# Patient Record
Sex: MALE | Race: WHITE | NOT HISPANIC OR LATINO | Employment: OTHER | URBAN - METROPOLITAN AREA
[De-identification: names, ages, dates, MRNs, and addresses within clinical notes are randomized per-mention and may not be internally consistent; named-entity substitution may affect disease eponyms.]

---

## 2017-08-02 ENCOUNTER — ALLSCRIPTS OFFICE VISIT (OUTPATIENT)
Dept: OTHER | Facility: OTHER | Age: 62
End: 2017-08-02

## 2017-08-02 LAB
BILIRUB UR QL STRIP: NEGATIVE
CLARITY UR: NORMAL
COLOR UR: YELLOW
GLUCOSE (HISTORICAL): NEGATIVE
HGB UR QL STRIP.AUTO: NEGATIVE
KETONES UR STRIP-MCNC: NEGATIVE MG/DL
LEUKOCYTE ESTERASE UR QL STRIP: NEGATIVE
NITRITE UR QL STRIP: NEGATIVE
PH UR STRIP.AUTO: 6.5 [PH]
PROT UR STRIP-MCNC: NEGATIVE MG/DL
SP GR UR STRIP.AUTO: 1.02
UROBILINOGEN UR QL STRIP.AUTO: 0.2

## 2018-01-02 DIAGNOSIS — N40.1 ENLARGED PROSTATE WITH LOWER URINARY TRACT SYMPTOMS (LUTS): ICD-10-CM

## 2018-01-02 DIAGNOSIS — Z87.898 PERSONAL HISTORY OF OTHER SPECIFIED CONDITIONS: ICD-10-CM

## 2018-01-14 VITALS
WEIGHT: 177.25 LBS | BODY MASS INDEX: 24.01 KG/M2 | HEIGHT: 72 IN | SYSTOLIC BLOOD PRESSURE: 134 MMHG | DIASTOLIC BLOOD PRESSURE: 72 MMHG

## 2018-02-08 RX ORDER — AZELASTINE HYDROCHLORIDE 137 UG/1
SPRAY, METERED NASAL
Refills: 0 | COMMUNITY
Start: 2018-01-20 | End: 2018-10-06

## 2018-02-09 ENCOUNTER — OFFICE VISIT (OUTPATIENT)
Dept: UROLOGY | Facility: MEDICAL CENTER | Age: 63
End: 2018-02-09
Payer: MEDICARE

## 2018-02-09 VITALS
WEIGHT: 180 LBS | DIASTOLIC BLOOD PRESSURE: 80 MMHG | SYSTOLIC BLOOD PRESSURE: 140 MMHG | BODY MASS INDEX: 24.38 KG/M2 | HEIGHT: 72 IN

## 2018-02-09 DIAGNOSIS — Z87.438 HISTORY OF BPH: Primary | ICD-10-CM

## 2018-02-09 DIAGNOSIS — R97.20 ELEVATED PSA: ICD-10-CM

## 2018-02-09 LAB
SL AMB  POCT GLUCOSE, UA: NORMAL
SL AMB LEUKOCYTE ESTERASE,UA: NORMAL
SL AMB POCT BILIRUBIN,UA: NORMAL
SL AMB POCT BLOOD,UA: NORMAL
SL AMB POCT CLARITY,UA: CLEAR
SL AMB POCT COLOR,UA: YELLOW
SL AMB POCT KETONES,UA: NORMAL
SL AMB POCT NITRITE,UA: NORMAL
SL AMB POCT PH,UA: 5.5
SL AMB POCT SPECIFIC GRAVITY,UA: 1
SL AMB POCT URINE PROTEIN: NORMAL
SL AMB POCT UROBILINOGEN: NORMAL

## 2018-02-09 PROCEDURE — 81003 URINALYSIS AUTO W/O SCOPE: CPT | Performed by: NURSE PRACTITIONER

## 2018-02-09 PROCEDURE — 99214 OFFICE O/P EST MOD 30 MIN: CPT | Performed by: NURSE PRACTITIONER

## 2018-02-09 RX ORDER — DEXTROMETHORPHAN HYDROBROMIDE AND PROMETHAZINE HYDROCHLORIDE 15; 6.25 MG/5ML; MG/5ML
SYRUP ORAL
Refills: 0 | COMMUNITY
Start: 2018-02-06 | End: 2018-10-06

## 2018-02-09 NOTE — PROGRESS NOTES
2/9/2018    Nick [facility]  1955  [id number]        Assessment  bph  Elevated PSA      Plan  psa free and total   FU 6mth      Discussion  Hieu Dunbar is a 57 y o  male being managed by Dr Biggs Grade many years  On no urologic  Meds  Denies any symptoms  PSA done and recent at 4 5, has been up and down for 4 yrs  Admits to having intercourse day before last PSA  We will repeat in 6 mth and is due for JOHNATHAN  On next visit as well  History of Present Illness  57 y o  male presents today for BPH follow up  Had PSA last month  Denies any   Obstructive or irritative symptoms  No changes in his medical history, no hospitilizations,  No new meds  Did tear his rotator cuff recently so doing physical  therapy  Review of Systems  Review of Systems - General ROS: negative  Respiratory ROS: no cough, shortness of breath, or wheezing  positive for - sputum changes and sinus issues due to allergies  Gastrointestinal ROS: no abdominal pain, change in bowel habits, or black or bloody stools  Genito-Urinary ROS: no dysuria, trouble voiding, or hematuria  Musculoskeletal ROS: positive for - torn rotator cuff          Past Medical History  Past Medical History:   Diagnosis Date    BPH with obstruction/lower urinary tract symptoms     Enlarged prostate     Feeling of incomplete bladder emptying     Frequency of urination     Hematuria, microscopic     Nocturia     Poor urinary stream        Past Social History  Past Surgical History:   Procedure Laterality Date    HERNIA REPAIR      KNEE SURGERY         Past Family History  Family History   Problem Relation Age of Onset    Cancer Mother        Past Social history  Social History     Social History    Marital status: /Civil Union     Spouse name: N/A    Number of children: N/A    Years of education: N/A     Occupational History    Not on file       Social History Main Topics    Smoking status: Never Smoker    Smokeless tobacco: Never Used   [employer] Alcohol use No    Drug use: No    Sexual activity: Not on file     Other Topics Concern    Not on file     Social History Narrative    No narrative on file       Current Medications  Current Outpatient Prescriptions   Medication Sig Dispense Refill    acetaminophen-codeine (TYLENOL #3) 300-30 mg per tablet take 1 tablet by mouth every 6 hours  0    amLODIPine (NORVASC) 10 mg tablet   0    ibuprofen (MOTRIN) 800 mg tablet take 1 tablet by mouth three times a day as directed  0    amoxicillin (AMOXIL) 500 mg capsule take 1 capsule by mouth every 8 hours  0    Azelastine HCl 137 MCG/SPRAY SOLN   0    chlorhexidine (PERIDEX) 0 12 % solution Rinse with 1/2 ounce by mouth twice a day for 1 week  0    HYDROcodone-acetaminophen (NORCO) 5-325 mg per tablet take 1 tablet by mouth every 6 hours  0    promethazine-dextromethorphan (PHENERGAN-DM) 6 25-15 mg/5 mL oral syrup take 7 5 milliliter by mouth every 8 hours if needed  0     No current facility-administered medications for this visit  Allergies  Allergies   Allergen Reactions    Antihistamines, Diphenhydramine-Type     Other      Annotation - [id number]: Spices       Past Medical History, Social History, Family History, medications and allergies were reviewed  Vitals  Vitals:    02/09/18 1343   BP: 140/80   Weight: 81 6 kg (180 lb)   Height: 6' (1 829 m)       Physical Exam  Skin: warm, dry, intact  Cardiac: S1S2, HRR, Peripheral edema: negative  Pulmonary: Non-labored breathing, stuffy nose due to sinus infection  Abdomen: Soft, non-tender, non-distended  No surgical scars  No masses, tenderness, hernias noted  Musculoskeletal: AROM with no joint deformity or tenderness  Neurology: alert, oriented x3, affect appropriate  Genitourinary: Negative CVA tenderness, negative suprapubic tenderness            Results  No results found for: PSA  No results found for: GLUCOSE, CALCIUM, NA, K, CO2, CL, BUN, CREATININE  No results found for: WBC, HGB, HCT, MCV, PLT

## 2018-06-12 ENCOUNTER — APPOINTMENT (OUTPATIENT)
Dept: LAB | Facility: CLINIC | Age: 63
End: 2018-06-12
Payer: MEDICARE

## 2018-06-12 DIAGNOSIS — Z87.898 PERSONAL HISTORY OF OTHER SPECIFIED CONDITIONS: ICD-10-CM

## 2018-06-12 DIAGNOSIS — N40.1 ENLARGED PROSTATE WITH LOWER URINARY TRACT SYMPTOMS (LUTS): ICD-10-CM

## 2018-06-12 PROCEDURE — 36415 COLL VENOUS BLD VENIPUNCTURE: CPT

## 2018-06-12 PROCEDURE — 84153 ASSAY OF PSA TOTAL: CPT

## 2018-06-12 PROCEDURE — 84154 ASSAY OF PSA FREE: CPT

## 2018-06-14 LAB
PSA FREE MFR SERPL: 17.5 %
PSA FREE SERPL-MCNC: 0.89 NG/ML
PSA SERPL-MCNC: 5.1 NG/ML (ref 0–4)

## 2018-08-30 ENCOUNTER — OFFICE VISIT (OUTPATIENT)
Dept: UROLOGY | Facility: CLINIC | Age: 63
End: 2018-08-30
Payer: MEDICARE

## 2018-08-30 VITALS
HEIGHT: 72 IN | BODY MASS INDEX: 24.81 KG/M2 | DIASTOLIC BLOOD PRESSURE: 80 MMHG | WEIGHT: 183.2 LBS | HEART RATE: 62 BPM | SYSTOLIC BLOOD PRESSURE: 118 MMHG

## 2018-08-30 DIAGNOSIS — R97.20 PSA ELEVATION: Primary | ICD-10-CM

## 2018-08-30 DIAGNOSIS — R97.20 ELEVATED PSA: ICD-10-CM

## 2018-08-30 LAB
SL AMB  POCT GLUCOSE, UA: NORMAL
SL AMB LEUKOCYTE ESTERASE,UA: NORMAL
SL AMB POCT BLOOD,UA: NORMAL
SL AMB POCT CLARITY,UA: CLEAR
SL AMB POCT COLOR,UA: YELLOW
SL AMB POCT KETONES,UA: NORMAL
SL AMB POCT NITRITE,UA: NORMAL
SL AMB POCT PH,UA: 6.5
SL AMB POCT SPECIFIC GRAVITY,UA: 1.01
SL AMB POCT URINE PROTEIN: NORMAL

## 2018-08-30 PROCEDURE — 99213 OFFICE O/P EST LOW 20 MIN: CPT | Performed by: PHYSICIAN ASSISTANT

## 2018-08-30 PROCEDURE — 81002 URINALYSIS NONAUTO W/O SCOPE: CPT | Performed by: PHYSICIAN ASSISTANT

## 2018-08-30 RX ORDER — CIPROFLOXACIN 500 MG/1
500 TABLET, FILM COATED ORAL 2 TIMES DAILY
Qty: 6 TABLET | Refills: 0 | Status: SHIPPED | OUTPATIENT
Start: 2018-08-30 | End: 2018-09-02

## 2018-08-30 NOTE — PROGRESS NOTES
1  PSA elevation  POCT urine dip    ciprofloxacin (CIPRO) 500 mg tablet   2  Elevated PSA  US guided prostate biopsy         Assessment and plan:       1  Elevated PSA  - m I reviewed with the patient that his PSA remains elevated  We did discuss the concern for possible prostate cancer  Patient will need to undergo prostate biopsy for further evaluation  We reviewed the risks of biopsy including bleeding and infection  Patient is to hold any anti-inflammatories or aspirin products 1 week prior to biopsy  He was instructed to initiate ciprofloxacin the day before biopsy and perform an enema the morning of biopsy  Patient verbalized understanding  All questions answered  -        Ranjan Garcia PA-C      Chief Complaint      Six-month follow-up elevated PSA    History of Present Illness     Mc Kilgore is a 57 y o  male patient with a history of BPH and elevated PSA presenting for follow-up  Patient's most recent PSA is 5 1 (06/12/2018) with a 17 5% free PSA  Patient states that his PSAs have been awful bleeding over the past few years  He denies ever undergoing a prostate biopsy  Patient believes the highest his PSA ever was was at his last appointment 6 months ago at 4 5  I had fortunately do not have any further records to be on then  Patient feels like he has a good urinary stream, feels empty after urination, has nocturia 4 times nightly  Denies any dysuria, gross hematuria, suprapubic pressure, flank pain, bone pain, weight loss  Laboratory      Ref Range & Units 6/12/18  8:49 AM Flag   Prostate Specific Antigen Total 0 0 - 4 0 ng/mL 5 1   H    Comment: Roche ECLIA methodology  According to the American Urological Association, Serum PSA should   decrease and remain at undetectable levels after radical   prostatectomy   The AUA defines biochemical recurrence as an initial   PSA value 0 2 ng/mL or greater followed by a subsequent confirmatory   PSA value 0 2 ng/mL or greater  Values obtained with different assay methods or kits cannot be used   interchangeably  Results cannot be interpreted as absolute evidence   of the presence or absence of malignant disease  PSA, Free N/A ng/mL 0 89     Comment: Roche ECLIA methodology  PSA, Free Pct % 17 5     Comment: The table below lists the probability of prostate cancer for   men with non-suspicious JOHNATHAN results and total PSA between   4 and 10 ng/mL, by patient age Shannon Osborn, [address] [place] [address],   [id number])                    % Free PSA       50-64 yr        65-75 yr                     0 00-10 00%        56%             55%                    10 01-15 00%        24%             35%                    15 01-20 00%        17%             23%                    20 01-25 00%        10%             20%                         >25 00%         5%              9%   Please note:  Nina et al did not make specific                 recommendations regarding the use of                 percent free PSA for any other population                 of men  Narrative     Performed at: [facility] [address]  [id number]  : Wyatt Bledsoe MD, Phone:  [phone]      Specimen Collected: 06/12/18  8:49 AM Last Resulted: 06/14/18  6:06             Review of Systems     Review of Systems   Constitutional: Negative for activity change, appetite change, chills, diaphoresis, fatigue, fever and unexpected weight change  Respiratory: Negative for chest tightness and shortness of breath  Cardiovascular: Negative for chest pain, palpitations and leg swelling  Gastrointestinal: Negative for abdominal distention, abdominal pain, constipation, diarrhea, nausea and vomiting  Genitourinary: Negative for decreased urine volume, difficulty urinating, dysuria, enuresis, flank pain, frequency, genital sores, hematuria and urgency  Musculoskeletal: Negative for back pain, gait problem and myalgias     Skin: Negative for color change, pallor, rash and wound  Psychiatric/Behavioral: Negative for behavioral problems  The patient is not nervous/anxious  Allergies     Allergies   Allergen Reactions    Antihistamines, Diphenhydramine-Type     Other      Annotation - [id number]: Spices       Physical Exam     Physical Exam   Constitutional: He is oriented to person, place, and time  He appears well-developed and well-nourished  No distress  HENT:   Head: Normocephalic and atraumatic  Eyes: Conjunctivae are normal    Neck: Normal range of motion  No tracheal deviation present  Pulmonary/Chest: Effort normal    Genitourinary:   Genitourinary Comments: Prostate: 50g, no nodules   Musculoskeletal: Normal range of motion  He exhibits no edema or deformity  Neurological: He is alert and oriented to person, place, and time  Skin: Skin is warm and dry  No rash noted  He is not diaphoretic  No erythema  Psychiatric: He has a normal mood and affect   His behavior is normal          Vital Signs     Vitals:    08/30/18 1040   BP: 118/80   Pulse: 62   Weight: 83 1 kg (183 lb 3 2 oz)   Height: 6' (1 829 m)         Current Medications       Current Outpatient Prescriptions:     amLODIPine (NORVASC) 10 mg tablet, , Disp: , Rfl: 0    acetaminophen-codeine (TYLENOL #3) 300-30 mg per tablet, take 1 tablet by mouth every 6 hours, Disp: , Rfl: 0    amoxicillin (AMOXIL) 500 mg capsule, take 1 capsule by mouth every 8 hours, Disp: , Rfl: 0    Azelastine HCl 137 MCG/SPRAY SOLN, , Disp: , Rfl: 0    chlorhexidine (PERIDEX) 0 12 % solution, Rinse with 1/2 ounce by mouth twice a day for 1 week, Disp: , Rfl: 0    ciprofloxacin (CIPRO) 500 mg tablet, Take 1 tablet (500 mg total) by mouth 2 (two) times a day for 3 days Please start day before prostate biopsy, Disp: 6 tablet, Rfl: 0    HYDROcodone-acetaminophen (NORCO) 5-325 mg per tablet, take 1 tablet by mouth every 6 hours, Disp: , Rfl: 0    ibuprofen (MOTRIN) 800 mg tablet, take 1 tablet by mouth three times a day as directed, Disp: , Rfl: 0    promethazine-dextromethorphan (PHENERGAN-DM) 6 25-15 mg/5 mL oral syrup, take 7 5 milliliter by mouth every 8 hours if needed, Disp: , Rfl: 0      Active Problems     There is no problem list on file for this patient          Past Medical History     Past Medical History:   Diagnosis Date    BPH with obstruction/lower urinary tract symptoms     Enlarged prostate     Feeling of incomplete bladder emptying     Frequency of urination     Hematuria, microscopic     Nocturia     Poor urinary stream          Surgical History     Past Surgical History:   Procedure Laterality Date    HERNIA REPAIR      KNEE SURGERY           Family History     Family History   Problem Relation Age of Onset    Cancer Mother          Social History     Social History       Radiology

## 2018-10-06 ENCOUNTER — HOSPITAL ENCOUNTER (EMERGENCY)
Facility: HOSPITAL | Age: 63
Discharge: HOME | End: 2018-10-06
Attending: EMERGENCY MEDICINE | Admitting: EMERGENCY MEDICINE
Payer: MEDICARE

## 2018-10-06 ENCOUNTER — APPOINTMENT (EMERGENCY)
Dept: RADIOLOGY | Facility: HOSPITAL | Age: 63
End: 2018-10-06
Payer: MEDICARE

## 2018-10-06 VITALS
BODY MASS INDEX: 24.38 KG/M2 | RESPIRATION RATE: 16 BRPM | TEMPERATURE: 97.2 F | HEIGHT: 72 IN | HEART RATE: 72 BPM | SYSTOLIC BLOOD PRESSURE: 157 MMHG | WEIGHT: 180 LBS | DIASTOLIC BLOOD PRESSURE: 78 MMHG | OXYGEN SATURATION: 98 %

## 2018-10-06 DIAGNOSIS — J40 BRONCHITIS: Primary | ICD-10-CM

## 2018-10-06 PROCEDURE — 99283 EMERGENCY DEPT VISIT LOW MDM: CPT

## 2018-10-06 PROCEDURE — 71046 X-RAY EXAM CHEST 2 VIEWS: CPT

## 2018-10-06 RX ORDER — AZELASTINE 1 MG/ML
1 SPRAY, METERED NASAL 2 TIMES DAILY
Qty: 30 ML | Refills: 0 | Status: SHIPPED | OUTPATIENT
Start: 2018-10-06

## 2018-10-06 RX ORDER — PROMETHAZINE HYDROCHLORIDE AND CODEINE PHOSPHATE 6.25; 1 MG/5ML; MG/5ML
5 SYRUP ORAL EVERY 4 HOURS PRN
Qty: 120 ML | Refills: 0 | Status: SHIPPED | OUTPATIENT
Start: 2018-10-06 | End: 2018-10-16

## 2018-10-06 RX ORDER — BENZONATATE 100 MG/1
100 CAPSULE ORAL 3 TIMES DAILY PRN
COMMUNITY

## 2018-10-06 RX ORDER — PREDNISONE 1 MG/1
TABLET ORAL DAILY
COMMUNITY

## 2018-10-06 RX ORDER — AZITHROMYCIN 1 G
1 PACKET (EA) ORAL AS NEEDED
COMMUNITY

## 2018-10-06 NOTE — ED PROVIDER NOTES
History  Chief Complaint   Patient presents with    Sinus Problem    Cough    Allergies     This is a 39-year-old male comes to the emergency room with complaint of cough with productive sputum yellowish to white x8 days  He states that he has been treated by his family doctor and on his own  He took approximately 3 days of amoxicillin 1 tablet b i d  followed by a Tessalon Perles, and promethazine syrup none of which seems to be helping his symptoms  Patient denies fever  He does state that he has episodes of hacking cough and   He also has a prescription for a Z-Yonatan which he is to get filled today from his family doctor  The patient has also been on prednisone 3 times daily  He does not know the dosage  He requests a nasal spray and promethazine with codeine as he states this has helped his symptoms in the past   He speaks chronic allergic symptoms this time of year  Prior to Admission Medications   Prescriptions Last Dose Informant Patient Reported? Taking? amLODIPine (NORVASC) 10 mg tablet   Yes No   azithromycin (ZITHROMAX) 1 g powder   Yes Yes   Sig: Take 1 packet by mouth once   benzonatate (TESSALON PERLES) 100 mg capsule  Self Yes Yes   Sig: Take 100 mg by mouth 3 (three) times a day as needed for cough   predniSONE 5 mg tablet  Self Yes Yes   Sig: Take by mouth daily      Facility-Administered Medications: None       Past Medical History:   Diagnosis Date    BPH with obstruction/lower urinary tract symptoms     Enlarged prostate     Feeling of incomplete bladder emptying     Frequency of urination     Hematuria, microscopic     Nocturia     Poor urinary stream        Past Surgical History:   Procedure Laterality Date    HERNIA REPAIR      KNEE SURGERY         Family History   Problem Relation Age of Onset    Cancer Mother      I have reviewed and agree with the history as documented      Social History   Substance Use Topics    Smoking status: Never Smoker    Smokeless tobacco: Never Used    Alcohol use No        Review of Systems   Constitutional: Negative  Negative for fever  HENT: Positive for congestion, postnasal drip and rhinorrhea  Negative for ear discharge and ear pain  Eyes: Negative  Negative for discharge, redness and itching  Respiratory: Positive for cough  Negative for choking, chest tightness and shortness of breath  Cardiovascular: Negative  Negative for chest pain  Gastrointestinal: Negative  Endocrine: Negative  Genitourinary: Negative  Musculoskeletal: Negative  Skin: Negative  Neurological: Negative  Psychiatric/Behavioral: Negative  Physical Exam  Physical Exam   Constitutional: He is oriented to person, place, and time  He appears well-developed and well-nourished  HENT:   Head: Normocephalic and atraumatic  Right Ear: External ear normal    Left Ear: External ear normal    Nose: Nose normal    Mouth/Throat: Oropharynx is clear and moist    Eyes: Pupils are equal, round, and reactive to light  Conjunctivae and EOM are normal    Neck: Normal range of motion  Cardiovascular: Normal rate, regular rhythm, normal heart sounds and intact distal pulses  Exam reveals no friction rub  No murmur heard  Pulmonary/Chest: Effort normal and breath sounds normal  He has no wheezes  He has no rales  He exhibits no tenderness  Musculoskeletal: Normal range of motion  Neurological: He is alert and oriented to person, place, and time  Skin: Skin is warm and dry  No rash noted  Psychiatric: He has a normal mood and affect  Nursing note and vitals reviewed        Vital Signs  ED Triage Vitals [10/06/18 0753]   Temperature Pulse Respirations Blood Pressure SpO2   (!) 97 2 °F (36 2 °C) 73 18 168/89 99 %      Temp Source Heart Rate Source Patient Position - Orthostatic VS BP Location FiO2 (%)   Tympanic Monitor Sitting Left arm --      Pain Score       3           Vitals:    10/06/18 0753   BP: 168/89   Pulse: 73 Patient Position - Orthostatic VS: Sitting       Visual Acuity      ED Medications  Medications - No data to display    Diagnostic Studies  Results Reviewed     None                 XR chest 2 views   ED Interpretation by Bree Daley MD (10/06 2861)   No pneumonia                 Procedures  Procedures       Phone Contacts  ED Phone Contact    ED Course                               MDM  CritCare Time    Disposition  Final diagnoses:   Bronchitis     Time reflects when diagnosis was documented in both MDM as applicable and the Disposition within this note     Time User Action Codes Description Comment    10/6/2018  8:19 AM Imani Garcia Add [J40] Bronchitis       ED Disposition     ED Disposition Condition Comment    Discharge  Thang Harms discharge to home/self care  Condition at discharge: Good        Follow-up Information     Follow up With Specialties Details Why Contact Info    Sarah Morocho, DO General Practice  For follow up of your current symptoms  [address]  [place] [address]  [phone]            Patient's Medications   Discharge Prescriptions    AZELASTINE (ASTELIN) 0 1 % NASAL SPRAY    1 spray into each nostril 2 (two) times a day Use in each nostril as directed       Start Date: 10/6/2018 End Date: --       Order Dose: 1 spray       Quantity: 30 mL    Refills: 0    PROMETHAZINE-CODEINE (PHENERGAN WITH CODEINE) 6 25-10 MG/5 ML SYRUP    Take 5 mL by mouth every 4 (four) hours as needed for cough for up to 10 days       Start Date: 10/6/2018 End Date: 10/16/2018       Order Dose: 5 mL       Quantity: 120 mL    Refills: 0     No discharge procedures on file      ED Provider  Electronically Signed by           Bree Daley MD  10/06/18 0910

## 2018-11-05 NOTE — PROGRESS NOTES
Office TRUS-guided Prostate Biopsy Procedure Note    AUA symptom score  Proceed to prostate biopsy    Indication    Elevated PSA total PSA 5 1, free PSA 17 5 as of 06/12/2018    Informed consent   The risks, benefits and alternatives to TRUS-guided prostate biopsy were conveyed to the patient prior to performing the procedure  A discussion of the risks of the procedure included, but was not limited to: pain, hematuria, hematochezia, hematospermia, infection, and the possibility of a non-diagnostic biopsy  The patient was given the opportunity to have his questions answered and there was no perceived barrier to education  Antibiotic prophylaxis   The patient received the following antibiotics at least 30 minutes prior to undergoing biopsy: Ciprofloxacin 500 mg PO  The patient was instructed to continue taking the antibiotics as prescribed for a total of 3 days, including the day of biopsy  Rectal cleansing  The patient was instructed to perform an evacuating rectal enema 1-2 hours prior to biopsy  Local anesthesia  Topical 2% lidocaine jelly was applied liberally to the anus and rectum and allowed to dwell for at least 5 min prior to starting the procedure  After insertion of the TRUS probe, 10 mL of 2% lidocaine solution was injected with ultrasound guidance at the  junction of the prostate and seminal vesicles  The anesthetic was allowed to dwell for at least 2 minutes prior to biopsy  Transrectal ultrasonography  The patient was placed in the left lateral decubitus position  After an attentive digital rectal examination, a 7 5 mHz sidefire ultrasound probe was gently inserted into the rectum and biplanar imaging of the prostate was done with the findings noted below  Images were taken of any abnormal findings and also to document prostate size      Bladder  The bladder base appeared normal     Prostate  Digital rectal exam findings:  - 60 gram estimated gland size    Ultrasound size measurements:  -Volume:  92 89 cm3    Ultrasound findings:  -Cysts: None  -Masses: None  -Median lobe: absent    Clinical stage (assuming a positive biopsy):   -T1c     TRUS-guided needle biopsy  Using an 18 gauge biopsy needle and ultrasound guidance, the following biopsies were taken:    1 core(s) from the left lateral base  1 core(s) from the left lateral mid-gland  1 core(s) from the right middle base  1 core(s) from the right lateral base  1 core(s) from the left lateral mid-gland  1 core(s) from the left middle mid-gland  1 core(s) from the right middle mid-gland  1 core(s) from the right lateral mid-gland  1 core(s) from the left lateral apex  1 core(s) from the left middle apex  1 core(s) from the right middle apex  1 core(s) from the right lateral apex    Total number of cores: 12                Complications  There were no procedural complications  Disposition  The patient was dismissed to home after 30 minutes of observation in stable condition  Post-procedure instructions: Today he underwent an uncomplicated transrectal ultrasound-guided biopsy of the prostate, following a periprosthetic nerve block  I reviewed the normal postprocedure a course including bleeding per recutm, hematuria, and hematospermia  I instructed him to complete his course of antibiotics as prescribed  Instructed him to call with fever greater than 101, chills, nausea, vomiting, poorly controlled pain  His followup was scheduled in approximately 2 weeks' time to review the pathology           Biopsy prostate     Date/Time 11/9/2018 10:24 AM     Performed by  Justin Kwong     Authorized by HANS ROSALES       Local anesthesia used: yes      Anesthesia: local infiltration     Anesthesia   Local anesthesia used: yes     Procedure Details   Procedure Notes: Uncomplicated prostate biopsy, no median lobe  Patient Transportation: confirmed  Patient tolerance: Patient tolerated the procedure well with no immediate complications

## 2018-11-05 NOTE — PATIENT INSTRUCTIONS
Prostate Gland Needle Biopsy   WHAT YOU NEED TO KNOW:   A prostate gland needle biopsy is a procedure to remove samples of tissue from your prostate gland  The prostate is a gland in men located just below the bladder and surrounds the urethra (tube that carries urine out of the body)  After the samples are removed, they are sent to a lab and tested for cancer  DISCHARGE INSTRUCTIONS:   Medicines:   · Antibiotics: This medicine is given to help treat or prevent an infection caused by bacteria  · Pain medicine: You may be given a prescription medicine to decrease pain  Do not wait until the pain is severe before you take this medicine  · Take your medicine as directed  Contact your healthcare provider if you think your medicine is not helping or if you have side effects  Tell him or her if you are allergic to any medicine  Keep a list of the medicines, vitamins, and herbs you take  Include the amounts, and when and why you take them  Bring the list or the pill bottles to follow-up visits  Carry your medicine list with you in case of an emergency  Follow up with your healthcare provider or urologist as directed: You may need to return for more tests or procedures  Write down your questions so you remember to ask them during your visits  Self-care:   · Eat healthy foods:  Healthy foods include fruits, vegetables, whole-grain breads, low-fat dairy products, beans, lean meats, and fish  Eat foods low in animal fat and saturated fats to help decrease your risk for prostate cancer  · Limit alcohol:  You should limit alcohol to 2 drinks a day  A drink of alcohol is 12 ounces of beer, 5 ounces of wine, or 1½ ounces of liquor  Contact your healthcare provider or urologist if:   · You cannot get an erection  · You feel pain or burning when you urinate  · You feel weak, and your face is hot and red  · You have a fever or chills      · You see blood in your urine, bowel movements, or semen     · Your urine is cloudy or smells foul  · You have questions or concerns about your condition or care  Seek care immediately or call 911 if:   · You bleed from your rectum  · You urinate very little or not at all  · You have pain from your procedure that gets worse, even after you take pain medicine  © 2017 [employer] Information is for End User's use only and may not be sold, redistributed or otherwise used for commercial purposes  All illustrations and images included in CareNotes® are the copyrighted property of A D [employer] M [employer]  or [employer]  The above information is an  only  It is not intended as medical advice for individual conditions or treatments  Talk to your doctor, nurse or pharmacist before following any medical regimen to see if it is safe and effective for you

## 2018-11-09 ENCOUNTER — PROCEDURE VISIT (OUTPATIENT)
Dept: UROLOGY | Facility: CLINIC | Age: 63
End: 2018-11-09
Payer: MEDICARE

## 2018-11-09 VITALS — BODY MASS INDEX: 24.24 KG/M2 | HEIGHT: 72 IN | WEIGHT: 179 LBS

## 2018-11-09 DIAGNOSIS — R97.20 ELEVATED PSA: Primary | ICD-10-CM

## 2018-11-09 PROCEDURE — G0416 PROSTATE BIOPSY, ANY MTHD: HCPCS | Performed by: PATHOLOGY

## 2018-11-09 PROCEDURE — 88344 IMHCHEM/IMCYTCHM EA MLT ANTB: CPT | Performed by: PATHOLOGY

## 2018-11-09 PROCEDURE — 55700 PR BIOPSY OF PROSTATE,NEEDLE/PUNCH: CPT | Performed by: UROLOGY

## 2018-11-09 PROCEDURE — 76942 ECHO GUIDE FOR BIOPSY: CPT | Performed by: UROLOGY

## 2018-11-09 NOTE — LETTER
November 9, 2018     Makenna Abdul DO  [address] [facility]    Patient: Romeo Mccartney   YOB: 1955   Date of Visit: 11/9/2018       Dear Dr Faizan Mascorro: Thank you for referring Chago Bedoya to me for evaluation  Below are my notes for this consultation  If you have questions, please do not hesitate to call me  I look forward to following your patient along with you  Sincerely,        Delmy Dye MD        CC: YURIDIA Moscoso MD  11/9/2018 10:24 AM  Sign at close encounter  Office TRUS-guided Prostate Biopsy Procedure Note    AUA symptom score  Proceed to prostate biopsy    Indication    Elevated PSA total PSA 5 1, free PSA 17 5 as of 06/12/2018    Informed consent   The risks, benefits and alternatives to TRUS-guided prostate biopsy were conveyed to the patient prior to performing the procedure  A discussion of the risks of the procedure included, but was not limited to: pain, hematuria, hematochezia, hematospermia, infection, and the possibility of a non-diagnostic biopsy  The patient was given the opportunity to have his questions answered and there was no perceived barrier to education  Antibiotic prophylaxis   The patient received the following antibiotics at least 30 minutes prior to undergoing biopsy: Ciprofloxacin 500 mg PO  The patient was instructed to continue taking the antibiotics as prescribed for a total of 3 days, including the day of biopsy  Rectal cleansing  The patient was instructed to perform an evacuating rectal enema 1-2 hours prior to biopsy  Local anesthesia  Topical 2% lidocaine jelly was applied liberally to the anus and rectum and allowed to dwell for at least 5 min prior to starting the procedure  After insertion of the TRUS probe, 10 mL of 2% lidocaine solution was injected with ultrasound guidance at the  junction of the prostate and seminal vesicles   The anesthetic was allowed to dwell for at least 2 minutes prior to biopsy  Transrectal ultrasonography  The patient was placed in the left lateral decubitus position  After an attentive digital rectal examination, a 7 5 mHz sidefire ultrasound probe was gently inserted into the rectum and biplanar imaging of the prostate was done with the findings noted below  Images were taken of any abnormal findings and also to document prostate size  Bladder  The bladder base appeared normal     Prostate  Digital rectal exam findings:  - 60 gram estimated gland size    Ultrasound size measurements:  -Volume:  92 89 cm3    Ultrasound findings:  -Cysts: None  -Masses: None  -Median lobe: absent    Clinical stage (assuming a positive biopsy):   -T1c     TRUS-guided needle biopsy  Using an 18 gauge biopsy needle and ultrasound guidance, the following biopsies were taken:    1 core(s) from the left lateral base  1 core(s) from the left lateral mid-gland  1 core(s) from the right middle base  1 core(s) from the right lateral base  1 core(s) from the left lateral mid-gland  1 core(s) from the left middle mid-gland  1 core(s) from the right middle mid-gland  1 core(s) from the right lateral mid-gland  1 core(s) from the left lateral apex  1 core(s) from the left middle apex  1 core(s) from the right middle apex  1 core(s) from the right lateral apex    Total number of cores: 12                Complications  There were no procedural complications  Disposition  The patient was dismissed to home after 30 minutes of observation in stable condition  Post-procedure instructions: Today he underwent an uncomplicated transrectal ultrasound-guided biopsy of the prostate, following a periprosthetic nerve block  I reviewed the normal postprocedure a course including bleeding per recutm, hematuria, and hematospermia  I instructed him to complete his course of antibiotics as prescribed   Instructed him to call with fever greater than 101, chills, nausea, vomiting, poorly controlled pain  His followup was scheduled in approximately 2 weeks' time to review the pathology           Biopsy prostate     Date/Time 11/9/2018 10:24 AM     Performed by  Noemy Dyson     Authorized by HANS ROSALES       Local anesthesia used: yes      Anesthesia: local infiltration     Anesthesia   Local anesthesia used: yes     Procedure Details   Procedure Notes: Uncomplicated prostate biopsy, no median lobe  Patient Transportation: confirmed  Patient tolerance: Patient tolerated the procedure well with no immediate complications

## 2018-11-27 PROBLEM — Z98.890 HISTORY OF NEEDLE BIOPSY OF PROSTATE WITH NEGATIVE RESULT: Status: ACTIVE | Noted: 2018-11-27

## 2018-11-27 PROBLEM — R97.20 ELEVATED PSA: Status: ACTIVE | Noted: 2018-11-27

## 2018-11-27 NOTE — PATIENT INSTRUCTIONS
PROSTATE CANCER SCREENING OVERVIEW    Prostate cancer screening involves testing for prostate cancer in men who have no symptoms of the disease  This testing can find cancer at an early stage  However, medical experts agree that prostate cancer screening should not be routinely ordered for all men and that screening can lead to both benefits and harms  This article is designed to review the advantages and disadvantages of prostate cancer screening  You should talk with your health care provider to decide what is best in your individual situation  WHAT IS PROSTATE CANCER? Prostate cancer is a cancer of the prostate, a small gland in men that is located below the bladder and in front of the rectum (figure 1)  The prostate produces fluid that helps carry sperm during ejaculation  Although many men are diagnosed with prostate cancer, most of them do not die from their cancer  Prostate cancer often grows so slowly that many men die of other causes before they even develop symptoms of prostate cancer  PROSTATE CANCER RISK FACTORS  Age -- All men are at risk for prostate cancer, but the risk greatly increases with older age  Prostate cancer is rarely found in men younger than 48years old  Ethnic background --  men develop prostate cancer more often than white and  men   men also are more likely to die of prostate cancer than white or  men  Family medical history -- Men who have a first-degree relative (a father or brother) with prostate cancer are more likely to develop the disease  Men with female relatives with breast cancer related to the breast cancer gene (BRCA) may also be more likely to develop prostate cancer  Diet -- A diet high in animal fat or low in vegetables may increase a man's risk of prostate cancer      PROSTATE CANCER SCREENING TESTS  Prostate cancer screening involves blood test that measures prostate-specific antigen (PSA)  Prostate-specific antigen (PSA) -- PSA is a protein produced by the prostate  The PSA test measures the amount of PSA in a sample of blood  Although many men with prostate cancer have an elevated PSA concentration, a high level does not necessarily mean there is a cancer  The most common cause for an elevated PSA is benign prostatic hyperplasia (BPH), a noncancerous enlargement of the prostate  Other causes include prostate infection (prostatitis), trauma (bicycle riding), and sexual activity  You should avoid ejaculating or riding a bike for at least 48 hours before having a PSA test  (See "Patient education: Benign prostatic hyperplasia (BPH) (Beyond the Basics)"  )    Rectal examination -- A rectal examination is sometimes recommended, along with measurement of the PSA, to screen for prostate cancer  However, studies have not shown that rectal examination is an effective screening test for prostate cancer when used alone  If the PSA test is positive -- A positive PSA test is not a reason to panic; noncancerous conditions are the most common causes for an abnormal test, particularly for PSA tests  On the other hand, a positive test should not be ignored  The first step in evaluating an elevated PSA is usually to repeat the test   You should avoid ejaculating and riding a bike for at least 48 hours before repeating the test  If the PSA remains elevated, a prostate biopsy or other testing is usually recommended  Prostate biopsy -- A prostate biopsy involves having a rectal ultrasound and use of a needle to obtain tissue samples from the prostate gland  The biopsy is usually performed in the office by a urologist (a doctor who specializes in treatment of urinary, bladder, and prostate issues)  After the procedure, most men feel sore and you may see some blood in the urine or semen, this can last for up to 4-6 weeks  Biopsies can rarely cause serious infections   Sometimes biopsies are guided by magnetic resonance imaging (MRI)  PROS AND CONS OF PROSTATE CANCER SCREENING    There are a number of arguments for and against prostate cancer screening  Arguments for screening -- Experts in favor of prostate cancer screening cite the following arguments:    ?Results from a large  study of prostate cancer screening found that men who had prostate-specific antigen (PSA) testing had a 20 percent lower chance of dying from prostate cancer after 13 years compared with men who did not have prostate cancer screening [1]  Men who had PSA testing also had a 30 percent lower chance of developing metastatic disease (cancer that has spread to other parts of the body) [2]  In another  study of prostate cancer screening, the mortality benefit was even larger to prostate cancer screening  (the [place] trial)    ? A substantial number of men die from prostate cancer every year and many more suffer from the complications of advanced disease  ? For men with an aggressive prostate cancer, the best chance for curing it is by finding it at an early stage and then treating it with surgery or radiation  Studies have shown that men who have prostate cancer detected by PSA screening tend to have earlier-stage cancer than men who have a cancer detected by other means  (See "Patient education: Treatment for advanced prostate cancer (Beyond the Basics)" and "Patient education: Prostate cancer treatment; stage I to III cancer (Beyond the Basics)" )    ? The five-year survival for men who have prostate cancer confined to the prostate gland (early stage) is nearly 100 percent; this drops to 27 percent for men whose cancer has spread to other areas of the body  However, many early-stage cancers are not aggressive, and the five-year survival for those will be nearly 100 percent even without any treatment [3]  ?The available screening tests are not perfect, but they are easy to perform and have fair accuracy    Arguments against screening -- Other arguments have also been made against screening:    ?Even though the  study found a benefit of prostate cancer screening, PSA testing prevented only about one prostate cancer death for every 1000 screened men after 13 years [1]  Furthermore, 76 percent of men with an abnormal PSA who had a prostate biopsy did not have prostate cancer  However, in the [place] study, the number needed to screen and treat was much lower indicating that prostate cancer screening is ineffective screening measure which decreases the morbidity and mortality of prostate cancer  ?Many prostate cancers detected with screening are unlikely to cause death or disability  Thus, a number of men will be diagnosed with cancer and potentially suffer the side effects of cancer treatment for cancers that never would have been found without prostate cancer screening  In other words, even if screening finds a cancer early, it is not clear in all cases that the cancer must be treated  IS PROSTATE CANCER SCREENING RIGHT FOR ME? The answer to this question is not the same for everyone  The table includes some questions you can ask yourself when weighing the potential risk and benefits of screening (table 1)  Professional organizations -- Major medical associations and societies, including the [employer] Task Force [4], American Cancer Society [5], American Urological Association [6], and many  cancer societies, agree that men should discuss screening with their health care providers  Men should be informed about the benefits and risks of prostate cancer screening and treatment and make decisions that best reflect their personal values and preferences  Age to first consider screening -- Screening discussions should begin at age 48 years for men at average risk for developing prostate cancer   Men with risk factors for prostate cancer (such as black men or men with a father or brother who had prostate cancer) may want to begin screening discussions at age 36 to 39 years  How often to perform screening -- Once screening begins, it should occur every two to four years (if continued testing is desired) and should include a PSA blood test   Age to stop screening -- Guidelines suggest stopping screening after age 71, though some experts would continue offering screening to very healthy men beyond that age  Screening not recommended -- Screening is generally not recommended for men whose life expectancy, or ability to undergo curative treatment, is limited by serious health problems  In these situations, the potential benefits of screening are outweighed by the likely harms  WHERE TO GET MORE INFORMATION  Your healthcare provider is the best source of information for questions and concerns related to your medical problem  This article will be updated as needed on our web site (www [employer]/patients)  Related topics for patients, as well as selected articles written for healthcare professionals, are also available  Some of the most relevant are listed below  Patient level information -- [employer] offers two types of patient education materials  The Basics -- The Basics patient education pieces answer the four or five key questions a patient might have about a given condition  These articles are best for patients who want a general overview and who prefer short, easy-to-read materials  Patient education: Prostate cancer screening (PSA tests) (The Basics)  Patient education: Prostate cancer (The Basics)  Patient education: Cancer screening (The Basics)  Patient education: Choosing treatment for low-risk localized prostate cancer (The Basics)  Beyond the Basics -- Beyond the Basics patient education pieces are longer, more sophisticated, and more detailed  These articles are best for patients who want in-depth information and are comfortable with some medical jargon    Patient education: Treatment for advanced prostate cancer (Beyond the Basics)  Patient education: Prostate cancer treatment; stage I to III cancer (Beyond the Basics)  Patient education: Benign prostatic hyperplasia (BPH) (Beyond the Basics)  Professional level information -- Professional level articles are designed to keep doctors and other health professionals up-to-date on the latest medical findings  These articles are thorough, long, and complex, and they contain multiple references to the research on which they are based  Professional level articles are best for people who are comfortable with a lot of medical terminology and who want to read the same materials their doctors are reading  Measurement of prostate-specific antigen  Screening for prostate cancer  The following organizations also provide reliable health information  ? JOEL Sultana, [place]  6-316-6-CANCER  (www cancer gov/types/prostate)  ? American Society for Clinical Oncology (patient information website)  (www cancer  net)  ? [facility] (patient and caregiver information website)  (www nccn org/patients)  ? [address]  1-898-HHR-2345  ([place]  org)  ? [employer]  (www happnplus gov/healthtopics  html)  ? [employer] Prostate Cancer Education and Support  (www ustoo  org/Detection-PSA-And-JOHNATHAN)

## 2018-11-30 ENCOUNTER — OFFICE VISIT (OUTPATIENT)
Dept: UROLOGY | Facility: CLINIC | Age: 63
End: 2018-11-30
Payer: MEDICARE

## 2018-11-30 VITALS
DIASTOLIC BLOOD PRESSURE: 80 MMHG | WEIGHT: 184.4 LBS | HEIGHT: 72 IN | BODY MASS INDEX: 24.98 KG/M2 | HEART RATE: 60 BPM | SYSTOLIC BLOOD PRESSURE: 122 MMHG

## 2018-11-30 DIAGNOSIS — R97.20 ELEVATED PSA: ICD-10-CM

## 2018-11-30 DIAGNOSIS — Z98.890 HISTORY OF NEEDLE BIOPSY OF PROSTATE WITH NEGATIVE RESULT: Primary | ICD-10-CM

## 2018-11-30 PROCEDURE — 99214 OFFICE O/P EST MOD 30 MIN: CPT | Performed by: UROLOGY

## 2018-11-30 NOTE — ASSESSMENT & PLAN NOTE
Patient with negative prostate biopsy results today  I reviewed these with the patient at length  We did discuss the potential for false negative results in the range of 15-30%      My threshold for re-biopsy in this gentleman would be quite high, likely greater than a PSA of 10-15, given that he has a history of fluctuating PSA in the past and now has a negative prostate biopsy

## 2018-11-30 NOTE — LETTER
November 30, 2018     Suzi Daley DO  [address] [facility] [address]    Patient: Citlaly Lopez   YOB: 1955   Date of Visit: 11/30/2018       Dear Dr Bernarda Gamez: Thank you for referring Makenna Smith to me for evaluation  Below are my notes for this consultation  If you have questions, please do not hesitate to call me  I look forward to following your patient along with you  Sincerely,        Krupa Magana MD        CC: No Recipients  Krupa Magana MD  11/30/2018 11:29 AM  Sign at close encounter       Problem List Items Addressed This Visit     History of needle biopsy of prostate with negative result - Primary     Patient with negative prostate biopsy results today  I reviewed these with the patient at length  We did discuss the potential for false negative results in the range of 15-30%  My threshold for re-biopsy in this gentleman would be quite high, likely greater than a PSA of 10-15, given that he has a history of fluctuating PSA in the past and now has a negative prostate biopsy         Relevant Orders    PSA Total, Diagnostic    Elevated PSA     PSA is likely due to BPH versus some degree of prostatitis given report of lifelong fluctuation of PSA  Plan:  Continue every 6 month PSA surveillance, if this continues to be reassuring, this can be checked every year until age 71 and then for cause after that         Relevant Orders    PSA Total, Diagnostic                Assessment and plan:       Please see problem oriented charting for the assessment plan of today's urological complaints    Krupa Magana MD      Chief Complaint     Chief Complaint   Patient presents with    Elevated PSA     Biopsy Results         History of Present Illness     Citlaly Lopez is a 57 y o  gentleman that has been seen by our office previously for elevated PSA, he is status post a prostate biopsy some weeks ago    He had no fevers, chills, or malaise after prostate biopsy  His prostate biopsy shows only benign prostatic enlargement with hypertrophy, no malignancy is noted  He was very happy to hear this result today  On review of systems today he denies dysuria, incontinence, hesitancy of urination, gross hematuria, urgency, nocturia, he feels empty after voiding and stream quality is good  The following portions of the patient's history were reviewed and updated as appropriate: allergies, current medications, past family history, past medical history, past social history, past surgical history and problem list     Detailed Urologic History     - please refer to HPI    Review of Systems     Review of Systems   Constitutional: Negative  HENT: Negative  Eyes: Negative  Respiratory: Negative  Cardiovascular: Negative  Gastrointestinal: Negative  Endocrine: Negative  Genitourinary: Negative  Musculoskeletal: Negative  Skin: Negative  Allergic/Immunologic: Negative  Neurological: Negative  Hematological: Negative  Psychiatric/Behavioral: Negative  Allergies     Allergies   Allergen Reactions    Antihistamines, Diphenhydramine-Type     Other      Annotation - [id number]: Spices       Physical Exam     Physical Exam   Constitutional: He is oriented to person, place, and time  He appears well-developed and well-nourished  No distress  HENT:   Head: Normocephalic and atraumatic  Right Ear: External ear normal    Left Ear: External ear normal    Nose: Nose normal    Eyes: Pupils are equal, round, and reactive to light  Conjunctivae and EOM are normal  Right eye exhibits no discharge  Left eye exhibits no discharge  No scleral icterus  Neck: Normal range of motion  Neck supple  Cardiovascular: Regular rhythm and intact distal pulses  Pulmonary/Chest: Effort normal  No stridor  No respiratory distress  He has no wheezes  Abdominal: Soft  He exhibits no distension and no mass  There is no tenderness   There is no rebound and no guarding  No hernia  Musculoskeletal: Normal range of motion  He exhibits no edema, tenderness or deformity  Neurological: He is alert and oriented to person, place, and time  No cranial nerve deficit or sensory deficit  He exhibits normal muscle tone  Coordination normal    Skin: Skin is warm and dry  No rash noted  He is not diaphoretic  No erythema  No pallor  Psychiatric: He has a normal mood and affect  His behavior is normal  Judgment and thought content normal    Nursing note and vitals reviewed            Vital Signs  Vitals:    11/30/18 1108   BP: 122/80   Pulse: 60   Weight: 83 6 kg (184 lb 6 4 oz)   Height: 6' (1 829 m)         Current Medications       Current Outpatient Prescriptions:     amLODIPine (NORVASC) 10 mg tablet, , Disp: , Rfl: 0    azelastine (ASTELIN) 0 1 % nasal spray, 1 spray into each nostril 2 (two) times a day Use in each nostril as directed (Patient not taking: Reported on 11/30/2018 ), Disp: 30 mL, Rfl: 0    azithromycin (ZITHROMAX) 1 g powder, Take 1 packet by mouth once, Disp: , Rfl:     benzonatate (TESSALON PERLES) 100 mg capsule, Take 100 mg by mouth 3 (three) times a day as needed for cough, Disp: , Rfl:     predniSONE 5 mg tablet, Take by mouth daily, Disp: , Rfl:       Active Problems     Patient Active Problem List   Diagnosis    History of needle biopsy of prostate with negative result    Elevated PSA         Past Medical History     Past Medical History:   Diagnosis Date    BPH with obstruction/lower urinary tract symptoms     Enlarged prostate     Feeling of incomplete bladder emptying     Frequency of urination     Hematuria, microscopic     Nocturia     Poor urinary stream          Surgical History     Past Surgical History:   Procedure Laterality Date    HERNIA REPAIR      KNEE SURGERY           Family History     Family History   Problem Relation Age of Onset    Cancer Mother          Social History     Social History     History Smoking Status    Never Smoker   Smokeless Tobacco    Never Used         Pertinent Lab Values     No results found for: CREATININE    Lab Results   Component Value Date    PSA 5 1 (H) 06/12/2018             Pertinent Imaging      There is no pertinent urological imaging for my review

## 2018-11-30 NOTE — ASSESSMENT & PLAN NOTE
PSA is likely due to BPH versus some degree of prostatitis given report of lifelong fluctuation of PSA      Plan:  Continue every 6 month PSA surveillance, if this continues to be reassuring, this can be checked every year until age 69 and then for cause after that

## 2019-05-30 ENCOUNTER — APPOINTMENT (OUTPATIENT)
Dept: LAB | Facility: CLINIC | Age: 64
End: 2019-05-30
Payer: MEDICARE

## 2019-05-30 DIAGNOSIS — R97.20 ELEVATED PSA: ICD-10-CM

## 2019-05-30 DIAGNOSIS — Z98.890 HISTORY OF NEEDLE BIOPSY OF PROSTATE WITH NEGATIVE RESULT: ICD-10-CM

## 2019-05-30 LAB — PSA SERPL-MCNC: 4 NG/ML (ref 0–4)

## 2019-05-30 PROCEDURE — 84153 ASSAY OF PSA TOTAL: CPT

## 2019-06-05 ENCOUNTER — OFFICE VISIT (OUTPATIENT)
Dept: UROLOGY | Facility: CLINIC | Age: 64
End: 2019-06-05
Payer: MEDICARE

## 2019-06-05 VITALS
SYSTOLIC BLOOD PRESSURE: 142 MMHG | BODY MASS INDEX: 25.19 KG/M2 | HEIGHT: 72 IN | WEIGHT: 186 LBS | DIASTOLIC BLOOD PRESSURE: 70 MMHG | HEART RATE: 68 BPM

## 2019-06-05 DIAGNOSIS — R97.20 ELEVATED PSA: Primary | ICD-10-CM

## 2019-06-05 LAB — POST-VOID RESIDUAL VOLUME, ML POC: 90 ML

## 2019-06-05 PROCEDURE — 99213 OFFICE O/P EST LOW 20 MIN: CPT | Performed by: PHYSICIAN ASSISTANT

## 2019-06-05 PROCEDURE — 51798 US URINE CAPACITY MEASURE: CPT | Performed by: PHYSICIAN ASSISTANT

## 2019-12-04 ENCOUNTER — APPOINTMENT (OUTPATIENT)
Dept: LAB | Facility: CLINIC | Age: 64
End: 2019-12-04
Payer: MEDICARE

## 2019-12-04 DIAGNOSIS — R97.20 ELEVATED PSA: ICD-10-CM

## 2019-12-04 LAB — PSA SERPL-MCNC: 4.8 NG/ML (ref 0–4)

## 2019-12-04 PROCEDURE — G0103 PSA SCREENING: HCPCS

## 2019-12-04 PROCEDURE — 36415 COLL VENOUS BLD VENIPUNCTURE: CPT

## 2019-12-06 NOTE — PROGRESS NOTES
Assessment and plan:       1  Elevated PSA status post negative prostate biopsy (11/09/2018) - Dr Derek Simental  - Patient's PSA continues to oscillate and is currently 4 8   - Dr Derek Simental has previously reported that his threshold to perform a subsequent prostate biopsy on this patient would be a PSA greater than 10-15   - Digital rectal exam is benign   - He will return in 1 year with PSA prior to visit  Prostate cancer screening can be discontinued at the age of 69 if no further biopsy is required  Levar Gomez PA-C      Chief Complaint     Chief Complaint   Patient presents with    Elevated PSA     status post negative prostate biopsy          History of Present Illness     Isha Trejo is a 70 y o  male patient known to Dr Derek Simental for an elevated PSA who is status post negative prostate biopsy performed 11/09/2018  Patient was found to have a PSA of 5 1 in June of 2008  Prostate biopsy was subsequently performed which was normal   Repeat PSA drawn 05/30/2019 was 4 0  Most recently he had this repeated on 12/04/2019 which was again elevated at 4 8  Patient has no health complaints at today's visit  He reports that he has a great urinary stream   He is emptying well with a PVR of 11 mL  Laboratory     No results found for: CREATININE    Lab Results   Component Value Date    PSA 4 8 (H) 12/04/2019    PSA 4 0 05/30/2019    PSA 5 1 (H) 06/12/2018       Recent Results (from the past 1 hour(s))   POCT Measure PVR    Collection Time: 12/09/19 11:01 AM   Result Value Ref Range    POST-VOID RESIDUAL VOLUME, ML POC 11 mL         Review of Systems     Review of Systems   Constitutional: Negative for chills and fever  HENT: Negative  Eyes: Negative  Respiratory: Negative for shortness of breath  Cardiovascular: Negative for chest pain  Gastrointestinal: Negative  Genitourinary: Negative for difficulty urinating, dysuria, enuresis, flank pain, frequency, hematuria and urgency  Musculoskeletal: Negative  Allergies     Allergies   Allergen Reactions    Antihistamines, Diphenhydramine-Type     Other      Annotation - [id number]: Spices       Physical Exam     Physical Exam   Constitutional: He is oriented to person, place, and time  He appears well-developed and well-nourished  No distress  HENT:   Head: Normocephalic and atraumatic  Right Ear: External ear normal    Left Ear: External ear normal    Nose: Nose normal    Eyes: Right eye exhibits no discharge  Left eye exhibits no discharge  No scleral icterus  Cardiovascular: Normal rate  Pulmonary/Chest: Effort normal    Genitourinary:   Genitourinary Comments: Prostate exam reveals an approximately 70 g gland that is smooth, nontender, and without nodules  Musculoskeletal:   Ambulates independently   Neurological: He is alert and oriented to person, place, and time  Skin: Skin is warm and dry  He is not diaphoretic  Psychiatric: He has a normal mood and affect  His behavior is normal  Judgment and thought content normal    Nursing note and vitals reviewed          Vital Signs     Vitals:    12/09/19 1054   BP: 140/80   Pulse: 78   Weight: 81 6 kg (180 lb)   Height: 6' (1 829 m)         Current Medications       Current Outpatient Medications:     amLODIPine (NORVASC) 10 mg tablet, , Disp: , Rfl: 0    azelastine (ASTELIN) 0 1 % nasal spray, 1 spray into each nostril 2 (two) times a day Use in each nostril as directed, Disp: 30 mL, Rfl: 0    azithromycin (ZITHROMAX) 1 g powder, Take 1 packet by mouth once, Disp: , Rfl:     benzonatate (TESSALON PERLES) 100 mg capsule, Take 100 mg by mouth 3 (three) times a day as needed for cough, Disp: , Rfl:     predniSONE 5 mg tablet, Take by mouth daily, Disp: , Rfl:       Active Problems     Patient Active Problem List   Diagnosis    History of needle biopsy of prostate with negative result    Elevated PSA         Past Medical History     Past Medical History: Diagnosis Date    BPH with obstruction/lower urinary tract symptoms     Enlarged prostate     Feeling of incomplete bladder emptying     Frequency of urination     Hematuria, microscopic     Nocturia     Poor urinary stream          Surgical History     Past Surgical History:   Procedure Laterality Date    HERNIA REPAIR      KNEE SURGERY           Family History     Family History   Problem Relation Age of Onset    Cancer Mother          Social History     Social History       Radiology

## 2019-12-09 ENCOUNTER — OFFICE VISIT (OUTPATIENT)
Dept: UROLOGY | Facility: CLINIC | Age: 64
End: 2019-12-09
Payer: MEDICARE

## 2019-12-09 VITALS
SYSTOLIC BLOOD PRESSURE: 140 MMHG | BODY MASS INDEX: 24.38 KG/M2 | WEIGHT: 180 LBS | HEIGHT: 72 IN | DIASTOLIC BLOOD PRESSURE: 80 MMHG | HEART RATE: 78 BPM

## 2019-12-09 DIAGNOSIS — R97.20 ELEVATED PSA: Primary | ICD-10-CM

## 2019-12-09 LAB — POST-VOID RESIDUAL VOLUME, ML POC: 11 ML

## 2019-12-09 PROCEDURE — 99213 OFFICE O/P EST LOW 20 MIN: CPT | Performed by: PHYSICIAN ASSISTANT

## 2019-12-09 PROCEDURE — 51798 US URINE CAPACITY MEASURE: CPT | Performed by: PHYSICIAN ASSISTANT

## 2021-04-14 ENCOUNTER — APPOINTMENT (OUTPATIENT)
Dept: LAB | Facility: CLINIC | Age: 66
End: 2021-04-14
Payer: MEDICARE

## 2021-04-14 DIAGNOSIS — R97.20 ELEVATED PSA: ICD-10-CM

## 2021-04-14 LAB — PSA SERPL-MCNC: 4.1 NG/ML (ref 0–4)

## 2021-04-14 PROCEDURE — 36415 COLL VENOUS BLD VENIPUNCTURE: CPT

## 2021-04-14 PROCEDURE — G0103 PSA SCREENING: HCPCS

## 2021-05-05 ENCOUNTER — OFFICE VISIT (OUTPATIENT)
Dept: UROLOGY | Facility: CLINIC | Age: 66
End: 2021-05-05
Payer: MEDICARE

## 2021-05-05 ENCOUNTER — TELEPHONE (OUTPATIENT)
Dept: UROLOGY | Facility: CLINIC | Age: 66
End: 2021-05-05

## 2021-05-05 VITALS
HEIGHT: 72 IN | HEART RATE: 73 BPM | DIASTOLIC BLOOD PRESSURE: 84 MMHG | SYSTOLIC BLOOD PRESSURE: 132 MMHG | WEIGHT: 189 LBS | BODY MASS INDEX: 25.6 KG/M2

## 2021-05-05 DIAGNOSIS — Z12.5 SCREENING FOR PROSTATE CANCER: ICD-10-CM

## 2021-05-05 DIAGNOSIS — R97.20 ELEVATED PSA: Primary | ICD-10-CM

## 2021-05-05 LAB
SL AMB  POCT GLUCOSE, UA: NORMAL
SL AMB LEUKOCYTE ESTERASE,UA: NORMAL
SL AMB POCT BILIRUBIN,UA: NORMAL
SL AMB POCT BLOOD,UA: NORMAL
SL AMB POCT CLARITY,UA: CLEAR
SL AMB POCT COLOR,UA: YELLOW
SL AMB POCT KETONES,UA: NORMAL
SL AMB POCT NITRITE,UA: NORMAL
SL AMB POCT PH,UA: 5
SL AMB POCT SPECIFIC GRAVITY,UA: 1.03
SL AMB POCT URINE PROTEIN: NORMAL
SL AMB POCT UROBILINOGEN: 0.2

## 2021-05-05 PROCEDURE — 99213 OFFICE O/P EST LOW 20 MIN: CPT | Performed by: PHYSICIAN ASSISTANT

## 2021-05-05 PROCEDURE — 81002 URINALYSIS NONAUTO W/O SCOPE: CPT | Performed by: PHYSICIAN ASSISTANT

## 2021-05-05 RX ORDER — IBUPROFEN 800 MG/1
TABLET ORAL AS NEEDED
COMMUNITY

## 2021-05-05 NOTE — PROGRESS NOTES
Assessment and plan:       1  Elevated PSA status post negative prostate biopsy (11/09/2018) - Dr Jalaine Hodgkins  - PSA stable with normal JOHNATHAN  - f/u 1 year PSA prior to visit for continued surveillance      Julianne Henry PA-C      Chief Complaint     Chief Complaint   Patient presents with    Elevated PSA       History of Present Illness     Doyle Khan is a 77 y o  male patient of Dr Jalaine Hodgkins with a history of elevated PSA  status post negative prostate biopsy (11/09/2018) presenting for follow up  Patient's PSA 4 1 (4/14/2021), previously 4 8 (12/4/2019), 4 0 (5/30/2019)  Comfortable with LUTS  Denies dysuria, gross hematuria, urinary infection  Urine dip shows trace leukocytes, nitrite and blood negative  AUA SYMPTOM SCORE      Most Recent Value   AUA SYMPTOM SCORE   How often have you had a sensation of not emptying your bladder completely after you finished urinating? 0   How often have you had to urinate again less than two hours after you finished urinating? 1   How often have you found you stopped and started again several times when you urinate?  0   How often have you found it difficult to postpone urination? 0   How often have you had a weak urinary stream?  0   How often have you had to push or strain to begin urination? 0   How many times did you most typically get up to urinate from the time you went to bed at night until the time you got up in the morning?   3   Quality of Life: If you were to spend the rest of your life with your urinary condition just the way it is now, how would you feel about that?  0   AUA SYMPTOM SCORE  4            Laboratory     No results found for: CREATININE    Lab Results   Component Value Date    PSA 4 1 (H) 04/14/2021    PSA 4 8 (H) 12/04/2019    PSA 4 0 05/30/2019       Recent Results (from the past 1 hour(s))   POCT urine dip    Collection Time: 05/05/21  1:48 PM   Result Value Ref Range    LEUKOCYTE ESTERASE,UA trace     NITRITE,UA - SL AMB POCT UROBILINOGEN 0 2     POCT URINE PROTEIN -      PH,UA 5 0     BLOOD,UA -     SPECIFIC GRAVITY,UA 1 030     KETONES,UA -     BILIRUBIN,UA -     GLUCOSE, UA -      COLOR,UA yellow     CLARITY,UA clear          Review of Systems     Review of Systems   Constitutional: Negative for chills and fever  HENT: Negative  Eyes: Negative  Respiratory: Negative for shortness of breath  Cardiovascular: Negative for chest pain  Gastrointestinal: Negative  Genitourinary: Negative for difficulty urinating, dysuria, enuresis, flank pain, frequency, hematuria and urgency  Musculoskeletal: Negative  Allergies     Allergies   Allergen Reactions    Antihistamines, Diphenhydramine-Type     Other      Annotation - [id number]: Spices       Physical Exam     Physical Exam  Vitals signs and nursing note reviewed  Constitutional:       General: He is not in acute distress  Appearance: He is well-developed  He is not diaphoretic  HENT:      Head: Normocephalic and atraumatic  Right Ear: External ear normal       Left Ear: External ear normal       Nose: Nose normal       Mouth/Throat:      Mouth: Mucous membranes are moist    Eyes:      General: No scleral icterus  Right eye: No discharge  Left eye: No discharge  Cardiovascular:      Rate and Rhythm: Normal rate  Pulmonary:      Effort: Pulmonary effort is normal  No respiratory distress  Genitourinary:     Comments: Prostate exam reveals an approximately 70 g gland that is smooth, nontender, and without nodules  Musculoskeletal:      Comments: Ambulates independently   Skin:     General: Skin is warm and dry  Neurological:      General: No focal deficit present  Mental Status: He is alert and oriented to person, place, and time  Psychiatric:         Behavior: Behavior normal          Thought Content:  Thought content normal          Judgment: Judgment normal            Vital Signs     Vitals:    05/05/21 1341   BP: 132/84   BP Location: Left arm   Patient Position: Sitting   Cuff Size: Large   Pulse: 73   Weight: 85 7 kg (189 lb)   Height: 6' (1 829 m)         Current Medications       Current Outpatient Medications:     amLODIPine (NORVASC) 10 mg tablet, , Disp: , Rfl: 0    azelastine (ASTELIN) 0 1 % nasal spray, 1 spray into each nostril 2 (two) times a day Use in each nostril as directed (Patient taking differently: 1 spray into each nostril as needed Use in each nostril as directed), Disp: 30 mL, Rfl: 0    azithromycin (ZITHROMAX) 1 g powder, Take 1 packet by mouth as needed , Disp: , Rfl:     ibuprofen (MOTRIN) 800 mg tablet, Take by mouth as needed for mild pain, Disp: , Rfl:     benzonatate (TESSALON PERLES) 100 mg capsule, Take 100 mg by mouth 3 (three) times a day as needed for cough, Disp: , Rfl:     predniSONE 5 mg tablet, Take by mouth daily, Disp: , Rfl:       Active Problems     Patient Active Problem List   Diagnosis    History of needle biopsy of prostate with negative result    Elevated PSA         Past Medical History     Past Medical History:   Diagnosis Date    BPH with obstruction/lower urinary tract symptoms     Enlarged prostate     Feeling of incomplete bladder emptying     Frequency of urination     Hematuria, microscopic     Nocturia     Poor urinary stream          Surgical History     Past Surgical History:   Procedure Laterality Date    HERNIA REPAIR      KNEE SURGERY           Family History     Family History   Problem Relation Age of Onset    Cancer Mother          Social History     Social History       Radiology

## 2022-04-22 ENCOUNTER — LAB (OUTPATIENT)
Dept: LAB | Facility: CLINIC | Age: 67
End: 2022-04-22
Payer: MEDICARE

## 2022-04-22 DIAGNOSIS — Z12.5 SCREENING FOR PROSTATE CANCER: ICD-10-CM

## 2022-04-22 LAB — PSA SERPL-MCNC: 5.4 NG/ML (ref 0–4)

## 2022-04-22 PROCEDURE — 36415 COLL VENOUS BLD VENIPUNCTURE: CPT

## 2022-04-22 PROCEDURE — G0103 PSA SCREENING: HCPCS

## 2022-04-25 ENCOUNTER — TELEPHONE (OUTPATIENT)
Dept: UROLOGY | Facility: CLINIC | Age: 67
End: 2022-04-25

## 2022-04-25 NOTE — TELEPHONE ENCOUNTER
Called patient unable to leave messages on both numbers one was full and the other did not have a voice mail set up      ----- Message from Phyllis Segovia PA-C sent at 4/25/2022  7:16 AM EDT -----  Please make sure patient has a follow up appt to review his elevated PSA  Nothing currently scheduled

## 2022-04-27 NOTE — PROGRESS NOTES
4/29/2022      Chief Complaint   Patient presents with    Elevated PSA       Assessment and Plan    1  Elevated PSA  - S/p negative prostate biopsy in 2018  - PSA from 4/22/22 slightly more elevated at 5 4  Previous PSAs: 4 1 (4/14/21), 4 8 (12/4/19), 4 0 (5/30/19), 5 1 (6/12/18)   - JOHNATHAN unremarkable  - Will repeat PSA in 6 months  If any progressive elevation, recommend mpMRI  2  Simple renal cortical cyst  - No further surveillance imaging indicated    History of Present Illness  Katie Suarez is a 74 y o  male here for follow up evaluation of  elevated PSA  Patient underwent prostate biopsy in 2018 which was negative for malignancy  Most recent PSA from 04/22/2022 is slightly more elevated 5 4  Previous PSAs trend has been 4 1 (4/14/21), 4 8 (12/4/19), 4 0 (5/30/19), and 5 1 (6/12/18)  Recently had chest CT which incidentally showed right renal cortical cysts and 1 cm cortical lesion in the left kidney  An ultrasound of the kidney and bladder was obtained which showed simple looking cortical cysts as well as enlarged prostate  He denies any urinary complaints or issues  Review of Systems   Constitutional: Negative for chills and fever  Respiratory: Negative for shortness of breath  Cardiovascular: Negative for chest pain  Gastrointestinal: Negative for abdominal pain  Genitourinary: Negative for difficulty urinating, dysuria, flank pain, frequency, hematuria and urgency  Neurological: Negative for dizziness                    Past Medical History  Past Medical History:   Diagnosis Date    BPH with obstruction/lower urinary tract symptoms     Enlarged prostate     Feeling of incomplete bladder emptying     Frequency of urination     Hematuria, microscopic     Nocturia     Poor urinary stream        Past Social History  Past Surgical History:   Procedure Laterality Date    HERNIA REPAIR      KNEE SURGERY       Social History     Tobacco Use   Smoking Status Never Smoker Smokeless Tobacco Never Used       Past Family History  Family History   Problem Relation Age of Onset    Cancer Mother        Past Social history  Social History     Socioeconomic History    Marital status: /Civil Union     Spouse name: Not on file    Number of children: Not on file    Years of education: Not on file    Highest education level: Not on file   Occupational History    Not on file   Tobacco Use    Smoking status: Never Smoker    Smokeless tobacco: Never Used   Vaping Use    Vaping Use: Never used   Substance and Sexual Activity    Alcohol use: No    Drug use: Yes     Types: Marijuana    Sexual activity: Not on file   Other Topics Concern    Not on file   Social History Narrative    Not on file     Social Determinants of Health     Financial Resource Strain: Not on file   Food Insecurity: Not on file   Transportation Needs: Not on file   Physical Activity: Not on file   Stress: Not on file   Social Connections: Not on file   Intimate Partner Violence: Not on file   Housing Stability: Not on file       Current Medications  Current Outpatient Medications   Medication Sig Dispense Refill    amLODIPine (NORVASC) 10 mg tablet   0    azelastine (ASTELIN) 0 1 % nasal spray 1 spray into each nostril 2 (two) times a day Use in each nostril as directed (Patient taking differently: 1 spray into each nostril as needed Use in each nostril as directed) 30 mL 0    azithromycin (ZITHROMAX) 1 g powder Take 1 packet by mouth as needed       benzonatate (TESSALON PERLES) 100 mg capsule Take 100 mg by mouth 3 (three) times a day as needed for cough      ibuprofen (MOTRIN) 800 mg tablet Take by mouth as needed for mild pain      montelukast (SINGULAIR) 10 mg tablet Take 10 mg by mouth      predniSONE 5 mg tablet Take by mouth daily       No current facility-administered medications for this visit         Allergies  Allergies   Allergen Reactions    Antihistamines, Diphenhydramine-Type     Other      Annotation - [id number]: Spices         The following portions of the patient's history were reviewed and updated as appropriate: allergies, current medications, past medical history, past social history, past surgical history and problem list       Vitals  Vitals:    04/29/22 0904   BP: 130/76   Pulse: 72   SpO2: 96%   Weight: 83 9 kg (185 lb)   Height: 6' (1 829 m)           Physical Exam  Physical Exam  Constitutional:       Appearance: Normal appearance  HENT:      Head: Normocephalic and atraumatic  Right Ear: External ear normal       Left Ear: External ear normal    Eyes:      General: No scleral icterus  Conjunctiva/sclera: Conjunctivae normal    Cardiovascular:      Pulses: Normal pulses  Pulmonary:      Effort: Pulmonary effort is normal    Genitourinary:     Comments: Prostate greater than 60 g without nodules or tenderness  Musculoskeletal:         General: Normal range of motion  Cervical back: Normal range of motion  Skin:     General: Skin is warm and dry  Neurological:      General: No focal deficit present  Mental Status: He is alert and oriented to person, place, and time  Psychiatric:         Mood and Affect: Mood normal          Behavior: Behavior normal          Thought Content: Thought content normal          Judgment: Judgment normal            Results  No results found for this or any previous visit (from the past 1 hour(s)) ]  Lab Results   Component Value Date    PSA 5 4 (H) 04/22/2022    PSA 4 1 (H) 04/14/2021    PSA 4 8 (H) 12/04/2019     No results found for: GLUCOSE, CALCIUM, NA, K, CO2, CL, BUN, CREATININE  No results found for: WBC, HGB, HCT, MCV, PLT        Orders  No orders of the defined types were placed in this encounter        Naeem Nguyễn PA-C

## 2022-04-29 ENCOUNTER — OFFICE VISIT (OUTPATIENT)
Dept: UROLOGY | Facility: CLINIC | Age: 67
End: 2022-04-29
Payer: MEDICARE

## 2022-04-29 VITALS
HEIGHT: 72 IN | BODY MASS INDEX: 25.06 KG/M2 | DIASTOLIC BLOOD PRESSURE: 76 MMHG | SYSTOLIC BLOOD PRESSURE: 130 MMHG | WEIGHT: 185 LBS | HEART RATE: 72 BPM | OXYGEN SATURATION: 96 %

## 2022-04-29 DIAGNOSIS — R97.20 ELEVATED PSA: Primary | ICD-10-CM

## 2022-04-29 PROCEDURE — 99213 OFFICE O/P EST LOW 20 MIN: CPT | Performed by: PHYSICIAN ASSISTANT

## 2022-04-29 RX ORDER — MONTELUKAST SODIUM 10 MG/1
10 TABLET ORAL
COMMUNITY
Start: 2021-12-08

## 2023-03-29 ENCOUNTER — TELEPHONE (OUTPATIENT)
Dept: UROLOGY | Facility: CLINIC | Age: 68
End: 2023-03-29

## 2023-03-29 NOTE — TELEPHONE ENCOUNTER
Called patient and left message  Patient is required to get a PSA done prior to his appointment,  The script is in the chart and they can go to any [facility] lab to have it done  If he is unable to get it done prior the patient will have to reschedule  I left the call centers number for any questions at [phone]    If patient can not have it done prior please assist in rescheduling the appointment

## 2023-03-31 NOTE — PROGRESS NOTES
4/4/2023      Chief Complaint   Patient presents with   • Elevated PSA     Assessment and Plan    1  Elevated PSA  - S/p negative prostate biopsy in 2018  - Most recent PSA from 3/15/23 was stable at 4 53  - Previous PSAs: 5 4 (4/22/22), 4 1 (4/14/21), 4 8 (12/4/19), 4 0 (5/30/19), 5 1 (6/12/18)   - JOHNATHAN benign  - Follow-up in 1 year with PSA  History of Present Illness  Lalit Alcazar is a 69 y o  male here for follow up evaluation of elevated PSA  He had prior negative prostate biopsy in 2018  His most recent PSA is stable at 4 53, prior PSAs as above  He denies any new or bothersome urinary issues  Review of Systems   Constitutional: Negative for chills and fever  Respiratory: Negative for shortness of breath  Cardiovascular: Negative for chest pain  Gastrointestinal: Negative for abdominal pain  Genitourinary: Negative for difficulty urinating, dysuria, flank pain, frequency, hematuria and urgency  Neurological: Negative for dizziness                    Past Medical History  Past Medical History:   Diagnosis Date   • BPH with obstruction/lower urinary tract symptoms    • Enlarged prostate    • Feeling of incomplete bladder emptying    • Frequency of urination    • Hematuria, microscopic    • Nocturia    • Poor urinary stream        Past Social History  Past Surgical History:   Procedure Laterality Date   • HERNIA REPAIR     • KNEE SURGERY       Social History     Tobacco Use   Smoking Status Never   Smokeless Tobacco Never       Past Family History  Family History   Problem Relation Age of Onset   • Cancer Mother        Past Social history  Social History     Socioeconomic History   • Marital status: /Civil Union     Spouse name: Not on file   • Number of children: Not on file   • Years of education: Not on file   • Highest education level: Not on file   Occupational History   • Not on file   Tobacco Use   • Smoking status: Never   • Smokeless tobacco: Never   Vaping Use   • Vaping Use: Never used   Substance and Sexual Activity   • Alcohol use: No   • Drug use: Yes     Types: Marijuana   • Sexual activity: Not on file   Other Topics Concern   • Not on file   Social History Narrative   • Not on file     Social Determinants of Health     Financial Resource Strain: Not on file   Food Insecurity: Not on file   Transportation Needs: Not on file   Physical Activity: Not on file   Stress: Not on file   Social Connections: Not on file   Intimate Partner Violence: Not on file   Housing Stability: Not on file       Current Medications  Current Outpatient Medications   Medication Sig Dispense Refill   • amLODIPine (NORVASC) 10 mg tablet   0   • amoxicillin (AMOXIL) 500 mg capsule Take 500 mg by mouth 3 (three) times a day     • azelastine (ASTELIN) 0 1 % nasal spray 1 spray into each nostril 2 (two) times a day Use in each nostril as directed (Patient taking differently: 1 spray into each nostril as needed Use in each nostril as directed) 30 mL 0   • azithromycin (ZITHROMAX) 1 g powder Take 1 packet by mouth as needed      • benzonatate (TESSALON PERLES) 100 mg capsule Take 100 mg by mouth 3 (three) times a day as needed for cough     • ibuprofen (MOTRIN) 800 mg tablet Take by mouth as needed for mild pain     • montelukast (SINGULAIR) 10 mg tablet Take 10 mg by mouth     • naproxen (NAPROSYN) 375 mg tablet Take 375 mg by mouth 2 (two) times a day     • neomycin-polymyxin-dexamethasone (MAXITROL) ophthalmic suspension instill 5 drops into left ear twice a day     • predniSONE 5 mg tablet Take by mouth daily       No current facility-administered medications for this visit         Allergies  Allergies   Allergen Reactions   • Antihistamines, Diphenhydramine-Type    • Other      Annotation - [id number]: Spices         The following portions of the patient's history were reviewed and updated as appropriate: allergies, current medications, past medical history, past social history, past surgical history and problem list       Vitals  Vitals:    04/04/23 0817   BP: 130/78   BP Location: Left arm   Patient Position: Sitting   Cuff Size: Large   Pulse: 69   Resp: 18   SpO2: 96%   Weight: 88 5 kg (195 lb)   Height: 6' (1 829 m)           Physical Exam  Physical Exam  Constitutional:       Appearance: Normal appearance  HENT:      Head: Normocephalic and atraumatic  Right Ear: External ear normal       Left Ear: External ear normal       Nose: Nose normal    Eyes:      General: No scleral icterus  Conjunctiva/sclera: Conjunctivae normal    Cardiovascular:      Pulses: Normal pulses  Pulmonary:      Effort: Pulmonary effort is normal    Genitourinary:     Comments: Prostate approx 40 g without nodules or tenderness  Musculoskeletal:         General: Normal range of motion  Cervical back: Normal range of motion  Skin:     General: Skin is warm and dry  Neurological:      General: No focal deficit present  Mental Status: He is alert and oriented to person, place, and time  Psychiatric:         Mood and Affect: Mood normal          Behavior: Behavior normal          Thought Content: Thought content normal          Judgment: Judgment normal            Results  No results found for this or any previous visit (from the past 1 hour(s)) ]  Lab Results   Component Value Date    PSA 5 4 (H) 04/22/2022    PSA 4 1 (H) 04/14/2021    PSA 4 8 (H) 12/04/2019     No results found for: GLUCOSE, CALCIUM, NA, K, CO2, CL, BUN, CREATININE  No results found for: WBC, HGB, HCT, MCV, PLT        Orders  No orders of the defined types were placed in this encounter      Fredy Shaikh

## 2023-04-04 ENCOUNTER — OFFICE VISIT (OUTPATIENT)
Dept: UROLOGY | Facility: CLINIC | Age: 68
End: 2023-04-04

## 2023-04-04 VITALS
HEART RATE: 69 BPM | HEIGHT: 72 IN | DIASTOLIC BLOOD PRESSURE: 78 MMHG | OXYGEN SATURATION: 96 % | RESPIRATION RATE: 18 BRPM | WEIGHT: 195 LBS | BODY MASS INDEX: 26.41 KG/M2 | SYSTOLIC BLOOD PRESSURE: 130 MMHG

## 2023-04-04 DIAGNOSIS — Z12.5 ENCOUNTER FOR SCREENING FOR MALIGNANT NEOPLASM OF PROSTATE: ICD-10-CM

## 2023-04-04 DIAGNOSIS — R97.20 ELEVATED PSA: Primary | ICD-10-CM

## 2023-04-04 RX ORDER — AMOXICILLIN 500 MG/1
500 CAPSULE ORAL 3 TIMES DAILY
COMMUNITY
Start: 2023-03-11

## 2023-04-04 RX ORDER — NAPROXEN 375 MG/1
375 TABLET ORAL 2 TIMES DAILY
COMMUNITY
Start: 2023-01-13

## 2023-04-04 RX ORDER — NEOMYCIN SULFATE, POLYMYXIN B SULFATE AND DEXAMETHASONE 3.5; 10000; 1 MG/ML; [USP'U]/ML; MG/ML
SUSPENSION/ DROPS OPHTHALMIC
COMMUNITY
Start: 2023-02-10

## 2023-12-08 ENCOUNTER — NURSE TRIAGE (OUTPATIENT)
Age: 68
End: 2023-12-08

## 2023-12-08 ENCOUNTER — TELEPHONE (OUTPATIENT)
Age: 68
End: 2023-12-08

## 2023-12-08 DIAGNOSIS — R39.9 UTI SYMPTOMS: Primary | ICD-10-CM

## 2023-12-08 NOTE — TELEPHONE ENCOUNTER
Patient called stating that he thinks he passed a kidney stone yesterday with a blood clot. He's  been drinking a lot of water and thinks he cleared it up on his own but wants to know if he should go for a urine test. Call was soft-transferred to uro pod RN.

## 2023-12-08 NOTE — TELEPHONE ENCOUNTER
Spoke to patient and he stated he passed a kidney stone and small clot. He is hydrating well and urine is pretty clear. He has slight discomfort on left side testicle to a pain level of one. He denies any abdominal pain, fevers, chills. He was given care advice to hydrate well with water and avoid bladder irritants. Urine orders are in chart for patient to have performed. I gave him ER precautions if he develops increased urinary symptoms, pain or hematuria over the weekend. I explained I would seek recommendations from provider to verify if imaging is needed. Please advise.

## 2023-12-08 NOTE — TELEPHONE ENCOUNTER
Pt of [facility] in [facility]. Last seen 04/4/23 for Elevated PSA    Called transferred. Pt thinks that he passed a kidney stone yesterday. Reports was having frequency yesterday and passed a "tiny stone with blood" Reports still had some discomfort throughout the day when urinating. Reports increased his hydration and today still having some discomfort around his testicles. No fever, chills, nausea or vomiting. Pt wants to just be sure nothing else happening. Orders placed for urine testing. Pt reports he will go tomorrow. Did review ER precautions, pt states understanding. Answer Questions - Initial Assessment   When did this pain start: yesterday    Where is your pain: groin area    Can you qualify the pain: improved    Do you have nausea or vomiting: denies    Do you have a fever 101 or higher: No    Do you have any urinary symptoms: yes    Do you have a history of kidney stones or UTI's: yes    Have you had recent urologic procedure or surgery: no    Have you had any urine testing within the last week: no    Protocols used:  FLANK PAIN / KIDNEY STONES / HYDRONEPHROSIS

## 2023-12-09 ENCOUNTER — APPOINTMENT (OUTPATIENT)
Dept: LAB | Facility: CLINIC | Age: 68
End: 2023-12-09
Payer: MEDICARE

## 2023-12-09 DIAGNOSIS — Z12.5 ENCOUNTER FOR SCREENING FOR MALIGNANT NEOPLASM OF PROSTATE: ICD-10-CM

## 2023-12-09 DIAGNOSIS — R39.9 UTI SYMPTOMS: ICD-10-CM

## 2023-12-09 DIAGNOSIS — R97.20 ELEVATED PSA: ICD-10-CM

## 2023-12-09 LAB
BACTERIA UR QL AUTO: ABNORMAL /HPF
BILIRUB UR QL STRIP: NEGATIVE
CLARITY UR: CLEAR
COLOR UR: ABNORMAL
GLUCOSE UR STRIP-MCNC: NEGATIVE MG/DL
HGB UR QL STRIP.AUTO: NEGATIVE
KETONES UR STRIP-MCNC: NEGATIVE MG/DL
LEUKOCYTE ESTERASE UR QL STRIP: ABNORMAL
MUCOUS THREADS UR QL AUTO: ABNORMAL
NITRITE UR QL STRIP: NEGATIVE
NON-SQ EPI CELLS URNS QL MICRO: ABNORMAL /HPF
PH UR STRIP.AUTO: 6.5 [PH]
PROT UR STRIP-MCNC: ABNORMAL MG/DL
PSA SERPL-MCNC: 6.85 NG/ML (ref 0–4)
RBC #/AREA URNS AUTO: ABNORMAL /HPF
SP GR UR STRIP.AUTO: 1.01 (ref 1–1.03)
UROBILINOGEN UR STRIP-ACNC: <2 MG/DL
WBC #/AREA URNS AUTO: ABNORMAL /HPF

## 2023-12-09 PROCEDURE — 36415 COLL VENOUS BLD VENIPUNCTURE: CPT

## 2023-12-09 PROCEDURE — G0103 PSA SCREENING: HCPCS

## 2023-12-09 PROCEDURE — 81001 URINALYSIS AUTO W/SCOPE: CPT

## 2023-12-09 PROCEDURE — 87086 URINE CULTURE/COLONY COUNT: CPT

## 2023-12-10 LAB — BACTERIA UR CULT: NORMAL

## 2023-12-11 DIAGNOSIS — R97.20 ELEVATED PSA: Primary | ICD-10-CM

## 2023-12-11 NOTE — TELEPHONE ENCOUNTER
Please reevaluate patient's symptoms, if he continues to have persistent symptoms post passage of stone then we can consider imaging with CT renal stone study. If symptoms have subsided may have just been residual pain/discomfort from recent stone passage. There we can discuss ordering an ultrasound instead. Urine testing was negative for infection    Patient with history of elevated PSA however looks like the PSA was drawn early which reveals continued elevation. I do recommend repeat at time a 1 year follow-up. May be exacerbated due to recent  tract manipulation/passage of stone. History of negative biopsy.

## 2023-12-15 ENCOUNTER — TELEPHONE (OUTPATIENT)
Age: 68
End: 2023-12-15

## 2023-12-15 NOTE — TELEPHONE ENCOUNTER
Patient reports burning with urination every now and then, believes he passed the stone but still having symptoms that come and go. No fevers or chills currently. Advised I will send a message to the providers to see if they would like a CT scan. Also informed patient of PSA to be obtained in 4-6 weeks. Patient verbalized understanding.

## 2023-12-18 DIAGNOSIS — N20.0 KIDNEY STONE: Primary | ICD-10-CM

## 2023-12-26 ENCOUNTER — HOSPITAL ENCOUNTER (OUTPATIENT)
Dept: CT IMAGING | Facility: HOSPITAL | Age: 68
Discharge: HOME | End: 2023-12-26
Payer: MEDICARE

## 2023-12-26 DIAGNOSIS — N20.0 KIDNEY STONE: ICD-10-CM

## 2023-12-26 PROCEDURE — G1004 CDSM NDSC: HCPCS

## 2023-12-26 PROCEDURE — 74176 CT ABD & PELVIS W/O CONTRAST: CPT

## 2024-01-04 DIAGNOSIS — N13.39 OTHER HYDRONEPHROSIS: Primary | ICD-10-CM

## 2024-01-04 DIAGNOSIS — N28.89 NODULE OF KIDNEY: ICD-10-CM

## 2024-01-05 ENCOUNTER — TELEPHONE (OUTPATIENT)
Dept: UROLOGY | Facility: CLINIC | Age: 69
End: 2024-01-05

## 2024-01-05 DIAGNOSIS — R39.9 UTI SYMPTOMS: ICD-10-CM

## 2024-01-05 DIAGNOSIS — N20.0 KIDNEY STONE: Primary | ICD-10-CM

## 2024-01-05 NOTE — TELEPHONE ENCOUNTER
Called and left VM for the PT to call the Office back. Office number left for the PT.    If PT calls the Office back please give the PT results and Cally SHI recommendations.    ----- Message from Cally Chapin PA-C sent at 1/4/2024 10:40 AM EST -----  CT shows mild left sided hydronephrosis but no evidence of ureteral stone, could be related to recent stone passage. Some inflammatory changes seen in bladder/bladder diverticulum which could also be related to recent stone passage or infection- recommend repeat urine culture. Incompletely evaluated indeterminate area on the left kidney - further evaluation with US recommended. Please order urine culture and arrange follow up in 2-4 weeks with US prior to visit

## 2024-01-11 NOTE — TELEPHONE ENCOUNTER
Called and spoke to patient reviewed Cally SHI's message below.  Patient will go for urine testing tomorrow and schedule ultrasound.  Patient thankful for call and verbalized understanding.

## 2024-01-17 ENCOUNTER — APPOINTMENT (OUTPATIENT)
Dept: LAB | Facility: CLINIC | Age: 69
End: 2024-01-17
Payer: MEDICARE

## 2024-01-18 ENCOUNTER — TELEPHONE (OUTPATIENT)
Dept: UROLOGY | Facility: CLINIC | Age: 69
End: 2024-01-18

## 2024-01-18 LAB — BACTERIA UR CULT: NORMAL

## 2024-01-18 NOTE — TELEPHONE ENCOUNTER
Called and left VM for the PT per CC with results. Office number left for the PT if any questions.    ----- Message from Cally Chapin PA-C sent at 1/18/2024 12:33 PM EST -----  Urine culture negative

## 2024-01-19 ENCOUNTER — HOSPITAL ENCOUNTER (OUTPATIENT)
Dept: ULTRASOUND IMAGING | Facility: CLINIC | Age: 69
Discharge: HOME | End: 2024-01-19
Payer: MEDICARE

## 2024-01-19 DIAGNOSIS — N28.89 NODULE OF KIDNEY: ICD-10-CM

## 2024-01-19 DIAGNOSIS — N13.39 OTHER HYDRONEPHROSIS: ICD-10-CM

## 2024-01-19 PROCEDURE — 76775 US EXAM ABDO BACK WALL LIM: CPT

## 2024-01-26 ENCOUNTER — TELEPHONE (OUTPATIENT)
Dept: UROLOGY | Facility: CLINIC | Age: 69
End: 2024-01-26

## 2024-01-26 NOTE — TELEPHONE ENCOUNTER
Called and left VM per cc informing pt No hydronephrosis on imaging and benign appearing renal cysts noted. Follow up as scheduled. Provided office number if pt has further questions.     ----- Message from Cally Chapin PA-C sent at 1/26/2024 10:46 AM EST -----  No hydronephrosis on imaging and benign appearing renal cysts noted. Follow up as scheduled.

## 2024-01-26 NOTE — TELEPHONE ENCOUNTER
Patient returned call regarding his test results. I told him he would get a call back.    CB: [phone]

## 2024-01-29 ENCOUNTER — TELEPHONE (OUTPATIENT)
Age: 69
End: 2024-01-29

## 2024-01-29 NOTE — TELEPHONE ENCOUNTER
Called and spoke with pt to go over Cally aparicio-c recommendation. Pt informed me someone else had already called him. No further explanation or questions needed.

## 2024-01-29 NOTE — TELEPHONE ENCOUNTER
Spoke to patient and gave him ultrasound results per AP review and recommendations. He was thankful for the feedback.

## 2024-03-27 ENCOUNTER — APPOINTMENT (OUTPATIENT)
Dept: LAB | Facility: CLINIC | Age: 69
End: 2024-03-27
Payer: MEDICARE

## 2024-03-27 DIAGNOSIS — R97.20 ELEVATED PSA: ICD-10-CM

## 2024-03-27 PROCEDURE — 84153 ASSAY OF PSA TOTAL: CPT

## 2024-03-27 PROCEDURE — 36415 COLL VENOUS BLD VENIPUNCTURE: CPT

## 2024-03-27 PROCEDURE — 84154 ASSAY OF PSA FREE: CPT

## 2024-03-28 LAB
PSA FREE MFR SERPL: 20.8 %
PSA FREE SERPL-MCNC: 1.5 NG/ML
PSA SERPL-MCNC: 7.2 NG/ML (ref 0–4)

## 2024-04-08 ENCOUNTER — OFFICE VISIT (OUTPATIENT)
Dept: UROLOGY | Facility: CLINIC | Age: 69
End: 2024-04-08
Payer: MEDICARE

## 2024-04-08 VITALS
TEMPERATURE: 97.6 F | RESPIRATION RATE: 18 BRPM | BODY MASS INDEX: 26.63 KG/M2 | HEIGHT: 72 IN | OXYGEN SATURATION: 96 % | DIASTOLIC BLOOD PRESSURE: 82 MMHG | SYSTOLIC BLOOD PRESSURE: 148 MMHG | WEIGHT: 196.6 LBS | HEART RATE: 64 BPM

## 2024-04-08 DIAGNOSIS — R97.20 ELEVATED PSA: Primary | ICD-10-CM

## 2024-04-08 PROCEDURE — 99214 OFFICE O/P EST MOD 30 MIN: CPT | Performed by: PHYSICIAN ASSISTANT

## 2024-04-08 NOTE — PROGRESS NOTES
4/8/2024      Chief Complaint   Patient presents with    Follow-up     Yearly PSA f/up     Assessment and Plan    1. Elevated PSA  - S/p negative prostate biopsy in 2018  - Most recent PSA from 3/27/24 was 7.2. Free PSA percentage was 20.8%  - JOHNATHAN negative.   - Discussed recommendations for prostate MRI given ongoing increasing PSA which he is agreeable with. Will utilize MRI for fusion guided prostate biopsy if concerning lesion.   - Follow up to review results.       History of Present Illness  Santi Stewart is a 68 y.o. male here for follow up evaluation of longstanding elevated PSA.     Component  Ref Range & Units 3/27/24 11:53 AM 12/9/23  9:52 AM 4/22/22 11:03 AM 4/14/21 11:17 AM 12/4/19  1:49 PM 5/30/19 10:52 AM 6/12/18  8:49 AM   Prostate Specific Antigen Total  0.0 - 4.0 ng/mL 7.2 High  6.85 High  R, CM 5.4 High  CM 4.1 High  CM 4.8 High  CM 4.0 CM 5.1 High  CM       Review of Systems   Constitutional:  Negative for chills and fever.   Respiratory:  Negative for shortness of breath.    Cardiovascular:  Negative for chest pain.   Gastrointestinal:  Negative for abdominal pain.   Genitourinary:  Negative for difficulty urinating, dysuria, flank pain, frequency, hematuria and urgency.   Neurological:  Negative for dizziness.                  Past Medical History  Past Medical History:   Diagnosis Date    BPH with obstruction/lower urinary tract symptoms     Enlarged prostate     Feeling of incomplete bladder emptying     Frequency of urination     Hematuria, microscopic     Nocturia     Poor urinary stream        Past Social History  Past Surgical History:   Procedure Laterality Date    HERNIA REPAIR      KNEE SURGERY       Social History     Tobacco Use   Smoking Status Never   Smokeless Tobacco Never       Past Family History  Family History   Problem Relation Age of Onset    Cancer Mother        Past Social history  Social History     Socioeconomic History    Marital status: /Civil Union      Spouse name: Not on file    Number of children: Not on file    Years of education: Not on file    Highest education level: Not on file   Occupational History    Not on file   Tobacco Use    Smoking status: Never    Smokeless tobacco: Never   Vaping Use    Vaping status: Never Used   Substance and Sexual Activity    Alcohol use: No    Drug use: Yes     Types: Marijuana    Sexual activity: Not on file   Other Topics Concern    Not on file   Social History Narrative    Not on file     Social Determinants of Health     Financial Resource Strain: Not on file   Food Insecurity: Not on file   Transportation Needs: Not on file   Physical Activity: Not on file   Stress: Not on file   Social Connections: Not on file   Intimate Partner Violence: Not on file   Housing Stability: Not on file       Current Medications  Current Outpatient Medications   Medication Sig Dispense Refill    amLODIPine (NORVASC) 10 mg tablet   0    azelastine (ASTELIN) 0.1 % nasal spray 1 spray into each nostril 2 (two) times a day Use in each nostril as directed (Patient taking differently: 1 spray into each nostril as needed Use in each nostril as directed) 30 mL 0    ibuprofen (MOTRIN) 800 mg tablet Take by mouth as needed for mild pain      amoxicillin (AMOXIL) 500 mg capsule Take 500 mg by mouth 3 (three) times a day (Patient not taking: Reported on 4/8/2024)      azithromycin (ZITHROMAX) 1 g powder Take 1 packet by mouth as needed  (Patient not taking: Reported on 4/8/2024)      benzonatate (TESSALON PERLES) 100 mg capsule Take 100 mg by mouth 3 (three) times a day as needed for cough (Patient not taking: Reported on 4/8/2024)      montelukast (SINGULAIR) 10 mg tablet Take 10 mg by mouth (Patient not taking: Reported on 4/8/2024)      naproxen (NAPROSYN) 375 mg tablet Take 375 mg by mouth 2 (two) times a day (Patient not taking: Reported on 4/8/2024)      neomycin-polymyxin-dexamethasone (MAXITROL) ophthalmic suspension instill 5 drops into left  ear twice a day (Patient not taking: Reported on 4/8/2024)      predniSONE 5 mg tablet Take by mouth daily (Patient not taking: Reported on 4/8/2024)       No current facility-administered medications for this visit.       Allergies  Allergies   Allergen Reactions    Antihistamines, Diphenhydramine-Type     Other      Annotation - [id number]: Spices         The following portions of the patient's history were reviewed and updated as appropriate: allergies, current medications, past medical history, past social history, past surgical history and problem list.      Vitals  Vitals:    04/08/24 1031   BP: 148/82   BP Location: Left arm   Patient Position: Sitting   Cuff Size: Standard   Pulse: 64   Resp: 18   Temp: 97.6 °F (36.4 °C)   TempSrc: Temporal   SpO2: 96%   Weight: 89.2 kg (196 lb 9.6 oz)   Height: 6' (1.829 m)           Physical Exam  Physical Exam  Constitutional:       Appearance: Normal appearance.   HENT:      Head: Normocephalic and atraumatic.      Right Ear: External ear normal.      Left Ear: External ear normal.      Nose: Nose normal.   Eyes:      General: No scleral icterus.     Conjunctiva/sclera: Conjunctivae normal.   Cardiovascular:      Pulses: Normal pulses.   Pulmonary:      Effort: Pulmonary effort is normal.   Genitourinary:     Comments: No prostatic nodularity or tenderness  Musculoskeletal:         General: Normal range of motion.      Cervical back: Normal range of motion.   Skin:     General: Skin is warm and dry.   Neurological:      General: No focal deficit present.      Mental Status: He is alert and oriented to person, place, and time.   Psychiatric:         Mood and Affect: Mood normal.         Behavior: Behavior normal.         Thought Content: Thought content normal.         Judgment: Judgment normal.           Results  No results found for this or any previous visit (from the past 1 hour(s)).]  Lab Results   Component Value Date    PSA 7.2 (H) 03/27/2024    PSA 6.85 (H)  "12/09/2023    PSA 5.4 (H) 04/22/2022     Lab Results   Component Value Date    CALCIUM 9.1 03/15/2023    K 3.4 (L) 03/15/2023    CO2 29 03/15/2023     03/15/2023    BUN 14 03/15/2023    CREATININE 1.20 03/15/2023     No results found for: \"WBC\", \"HGB\", \"HCT\", \"MCV\", \"PLT\"        Orders  No orders of the defined types were placed in this encounter.      Cally Chapin      "

## 2024-04-20 ENCOUNTER — APPOINTMENT (OUTPATIENT)
Dept: LAB | Facility: CLINIC | Age: 69
End: 2024-04-20
Payer: MEDICARE

## 2024-04-20 DIAGNOSIS — R97.20 ELEVATED PSA: ICD-10-CM

## 2024-04-20 LAB
ANION GAP SERPL CALCULATED.3IONS-SCNC: 7 MMOL/L (ref 4–13)
BUN SERPL-MCNC: 14 MG/DL (ref 5–25)
CALCIUM SERPL-MCNC: 9.1 MG/DL (ref 8.4–10.2)
CHLORIDE SERPL-SCNC: 105 MMOL/L (ref 96–108)
CO2 SERPL-SCNC: 30 MMOL/L (ref 21–32)
CREAT SERPL-MCNC: 1.06 MG/DL (ref 0.6–1.3)
GFR SERPL CREATININE-BSD FRML MDRD: 70 ML/MIN/1.73SQ M
GLUCOSE P FAST SERPL-MCNC: 92 MG/DL (ref 65–99)
POTASSIUM SERPL-SCNC: 3.3 MMOL/L (ref 3.5–5.3)
SODIUM SERPL-SCNC: 142 MMOL/L (ref 135–147)

## 2024-04-20 PROCEDURE — 80048 BASIC METABOLIC PNL TOTAL CA: CPT

## 2024-04-20 PROCEDURE — 36415 COLL VENOUS BLD VENIPUNCTURE: CPT

## 2024-04-30 ENCOUNTER — HOSPITAL ENCOUNTER (OUTPATIENT)
Dept: RADIOLOGY | Age: 69
Discharge: HOME | End: 2024-04-30
Payer: MEDICARE

## 2024-04-30 DIAGNOSIS — R97.20 ELEVATED PSA: ICD-10-CM

## 2024-04-30 PROCEDURE — 72197 MRI PELVIS W/O & W/DYE: CPT

## 2024-04-30 PROCEDURE — 76377 3D RENDER W/INTRP POSTPROCES: CPT

## 2024-04-30 PROCEDURE — A9585 GADOBUTROL INJECTION: HCPCS | Performed by: PHYSICIAN ASSISTANT

## 2024-04-30 RX ORDER — GADOBUTROL 604.72 MG/ML
9 INJECTION INTRAVENOUS
Status: COMPLETED | OUTPATIENT
Start: 2024-04-30 | End: 2024-04-30

## 2024-04-30 RX ADMIN — GADOBUTROL 9 ML: 604.72 INJECTION INTRAVENOUS at 10:10

## 2024-05-06 ENCOUNTER — TELEPHONE (OUTPATIENT)
Dept: UROLOGY | Facility: CLINIC | Age: 69
End: 2024-05-06

## 2024-05-09 ENCOUNTER — TELEPHONE (OUTPATIENT)
Dept: UROLOGY | Facility: CLINIC | Age: 69
End: 2024-05-09

## 2024-05-09 DIAGNOSIS — R97.20 ELEVATED PSA: Primary | ICD-10-CM

## 2024-05-09 NOTE — TELEPHONE ENCOUNTER
Called and left VM for the PT to call the Office. Office number left for the PT.    If PT calls back please let PT know results and Cally SHI recommendations as well as a follow up for the PT.    ----- Message from Cally Chapin PA-C sent at 5/9/2024 11:36 AM EDT -----  Please inform patient that his prostate MRI is fortunately negative. Elevated PSA likely secondary to his very large prostate. Can f/u in 3-4 months with a repeat PSA

## 2024-05-09 NOTE — TELEPHONE ENCOUNTER
Spoke to patient and gave him MRI results per AP review and per AP recommendations he is to follow up in 3-4 months with a PSA prior to appointment. Orders for PSA are entered and he is scheduled for 9/9/2024 in [facility]. He was thankful for the call.

## 2024-05-10 NOTE — TELEPHONE ENCOUNTER
Spoke with patient; Aware of MRI results and will follow up as scheduled with PSA prior.    Patient thankful for call.

## 2024-05-10 NOTE — TELEPHONE ENCOUNTER
"See result note - \"Please inform patient that his prostate MRI is fortunately negative. Elevated PSA likely secondary to his very large prostate. Can f/u in 3-4 months with a repeat PSA\""

## 2024-08-26 ENCOUNTER — APPOINTMENT (OUTPATIENT)
Dept: LAB | Facility: CLINIC | Age: 69
End: 2024-08-26
Payer: MEDICARE

## 2024-08-26 DIAGNOSIS — R97.20 ELEVATED PSA: ICD-10-CM

## 2024-08-26 LAB — PSA SERPL-MCNC: 6.79 NG/ML (ref 0–4)

## 2024-08-26 PROCEDURE — 84153 ASSAY OF PSA TOTAL: CPT

## 2024-08-26 PROCEDURE — 36415 COLL VENOUS BLD VENIPUNCTURE: CPT

## 2024-09-06 NOTE — PROGRESS NOTES
9/9/2024      Chief Complaint   Patient presents with    Elevated PSA    Follow-up         Assessment and Plan    1. Elevated PSA  - S/p negative prostate biopsy in 2018  - PSA increased 7.2  - mpMRI from 4/30/24 - PIRADS 2, calculated prostate volume of 107 mL   - JOHNATHAN negative at prior visit.   - PSA from 8/26/24 decreased to 6.788  - PSAD - 0.06  - Given negative work-up and PSA decrease, can follow up in 1 year with repeat PSA    2. BPH   - Urinated prior to visit. Random bladder scan 170 mL   - Denies any voiding symptoms.   - Will continue to monitor voiding parameters.   - Recommend timed voiding and double voiding    History of Present Illness  Santi Stewart is a 68 y.o. male here for follow up evaluation of elevated PSA. He denies any urinary complaints. Doing well.       Review of Systems   Constitutional:  Negative for chills and fever.   Respiratory:  Negative for shortness of breath.    Cardiovascular:  Negative for chest pain.   Gastrointestinal:  Negative for abdominal pain.   Genitourinary:  Negative for difficulty urinating, dysuria, flank pain, frequency, hematuria and urgency.   Neurological:  Negative for dizziness.                  Past Medical History  Past Medical History:   Diagnosis Date    BPH with obstruction/lower urinary tract symptoms     Enlarged prostate     Feeling of incomplete bladder emptying     Frequency of urination     Hematuria, microscopic     Nocturia     Poor urinary stream        Past Social History  Past Surgical History:   Procedure Laterality Date    HERNIA REPAIR      KNEE SURGERY       Social History     Tobacco Use   Smoking Status Never    Passive exposure: Never   Smokeless Tobacco Never       Past Family History  Family History   Problem Relation Age of Onset    Cancer Mother        Past Social history  Social History     Socioeconomic History    Marital status: /Civil Union     Spouse name: Not on file    Number of children: Not on file    Years of  education: Not on file    Highest education level: Not on file   Occupational History    Not on file   Tobacco Use    Smoking status: Never     Passive exposure: Never    Smokeless tobacco: Never   Vaping Use    Vaping status: Never Used   Substance and Sexual Activity    Alcohol use: No    Drug use: Yes     Types: Marijuana    Sexual activity: Yes     Partners: Female   Other Topics Concern    Not on file   Social History Narrative    Not on file     Social Determinants of Health     Financial Resource Strain: Not on file   Food Insecurity: Not on file   Transportation Needs: Not on file   Physical Activity: Not on file   Stress: Not on file   Social Connections: Not on file   Intimate Partner Violence: Not on file   Housing Stability: Not on file       Current Medications  Current Outpatient Medications   Medication Sig Dispense Refill    amLODIPine (NORVASC) 10 mg tablet   0     No current facility-administered medications for this visit.       Allergies  Allergies   Allergen Reactions    Antihistamines, Diphenhydramine-Type     Other      Annotation - [id number]: Spices         The following portions of the patient's history were reviewed and updated as appropriate: allergies, current medications, past medical history, past social history, past surgical history and problem list.      Vitals  Vitals:    09/09/24 0912   BP: 140/84   Pulse: 65   Resp: 18   Temp: 97.8 °F (36.6 °C)   TempSrc: Temporal   SpO2: 96%   Weight: 82.1 kg (181 lb)   Height: 6' (1.829 m)           Physical Exam  Physical Exam  Constitutional:       Appearance: Normal appearance.   HENT:      Head: Normocephalic and atraumatic.      Right Ear: External ear normal.      Left Ear: External ear normal.      Nose: Nose normal.   Eyes:      General: No scleral icterus.     Conjunctiva/sclera: Conjunctivae normal.   Cardiovascular:      Pulses: Normal pulses.   Pulmonary:      Effort: Pulmonary effort is normal.   Musculoskeletal:         General:  "Normal range of motion.      Cervical back: Normal range of motion.   Neurological:      General: No focal deficit present.      Mental Status: He is alert and oriented to person, place, and time.   Psychiatric:         Mood and Affect: Mood normal.         Behavior: Behavior normal.         Thought Content: Thought content normal.         Judgment: Judgment normal.           Results  Recent Results (from the past 1 hour(s))   POCT Measure PVR    Collection Time: 09/09/24  9:16 AM   Result Value Ref Range    POST-VOID RESIDUAL VOLUME, ML  mL   ]  Lab Results   Component Value Date    PSA 6.788 (H) 08/26/2024    PSA 7.2 (H) 03/27/2024    PSA 6.85 (H) 12/09/2023     Lab Results   Component Value Date    CALCIUM 9.1 04/20/2024    K 3.3 (L) 04/20/2024    CO2 30 04/20/2024     04/20/2024    BUN 14 04/20/2024    CREATININE 1.06 04/20/2024     No results found for: \"WBC\", \"HGB\", \"HCT\", \"MCV\", \"PLT\"        Orders  Orders Placed This Encounter   Procedures    POCT Measure PVR       Cally Chapin      "

## 2024-09-09 ENCOUNTER — OFFICE VISIT (OUTPATIENT)
Dept: UROLOGY | Facility: CLINIC | Age: 69
End: 2024-09-09
Payer: MEDICARE

## 2024-09-09 VITALS
TEMPERATURE: 97.8 F | HEIGHT: 72 IN | RESPIRATION RATE: 18 BRPM | DIASTOLIC BLOOD PRESSURE: 84 MMHG | SYSTOLIC BLOOD PRESSURE: 140 MMHG | WEIGHT: 181 LBS | BODY MASS INDEX: 24.52 KG/M2 | HEART RATE: 65 BPM | OXYGEN SATURATION: 96 %

## 2024-09-09 DIAGNOSIS — R97.20 ELEVATED PSA: Primary | ICD-10-CM

## 2024-09-09 LAB — POST-VOID RESIDUAL VOLUME, ML POC: 178 ML

## 2024-09-09 PROCEDURE — 99213 OFFICE O/P EST LOW 20 MIN: CPT | Performed by: PHYSICIAN ASSISTANT

## 2024-09-09 PROCEDURE — 51798 US URINE CAPACITY MEASURE: CPT | Performed by: PHYSICIAN ASSISTANT

## 2025-01-03 ENCOUNTER — NURSE TRIAGE (OUTPATIENT)
Age: 70
End: 2025-01-03

## 2025-01-03 ENCOUNTER — APPOINTMENT (OUTPATIENT)
Dept: LAB | Facility: CLINIC | Age: 70
End: 2025-01-03
Payer: MEDICARE

## 2025-01-03 DIAGNOSIS — R39.9 UTI SYMPTOMS: Primary | ICD-10-CM

## 2025-01-03 LAB
BACTERIA UR QL AUTO: ABNORMAL /HPF
BILIRUB UR QL STRIP: NEGATIVE
CLARITY UR: CLEAR
COLOR UR: COLORLESS
GLUCOSE UR STRIP-MCNC: NEGATIVE MG/DL
HGB UR QL STRIP.AUTO: NEGATIVE
KETONES UR STRIP-MCNC: NEGATIVE MG/DL
LEUKOCYTE ESTERASE UR QL STRIP: ABNORMAL
NITRITE UR QL STRIP: NEGATIVE
NON-SQ EPI CELLS URNS QL MICRO: ABNORMAL /HPF
PH UR STRIP.AUTO: 6.5 [PH]
PROT UR STRIP-MCNC: NEGATIVE MG/DL
RBC #/AREA URNS AUTO: ABNORMAL /HPF
SP GR UR STRIP.AUTO: 1.01 (ref 1–1.03)
UROBILINOGEN UR STRIP-ACNC: <2 MG/DL
WBC #/AREA URNS AUTO: ABNORMAL /HPF

## 2025-01-03 PROCEDURE — 81001 URINALYSIS AUTO W/SCOPE: CPT

## 2025-01-03 PROCEDURE — 87086 URINE CULTURE/COLONY COUNT: CPT

## 2025-01-03 NOTE — TELEPHONE ENCOUNTER
"Answer Assessment - Initial Assessment Questions  1. REASON FOR CALL: \"What is the main reason for your call?\" or \"How can I best help you?\"          Patient called in to report that he passed either a small blood clot or kidney stone on 12/31/24. States he had no prior symptoms. Patient does feel pain in his urethra for the past three days. Denies any other symptoms at this time. States his wife believes he may have a UTI. Placed urine orders to rule out infection. Advised to increase water intake and avoid bladder irritants. ED precautions reviewed.    Protocols used: Information Only Call - No Triage-Adult-[place]    "

## 2025-01-04 LAB — BACTERIA UR CULT: NORMAL

## 2025-01-06 ENCOUNTER — RESULTS FOLLOW-UP (OUTPATIENT)
Dept: UROLOGY | Facility: CLINIC | Age: 70
End: 2025-01-06

## 2025-01-06 NOTE — TELEPHONE ENCOUNTER
Spoke with pt to inform him that urine testing was negative (see result note). He states that he saw his PCP and was given abx for infection after a positive urine dip. Pt also given pyridium. Pt says he has been taking these. Denies any blood in the urine. Pt states that his PCP is sending him for additional testing with a PSA level and will follow up with him. Advised pt to call office if he begins experiencing symptoms such as hematuria. Pt verbalized understanding.

## 2025-02-06 NOTE — TELEPHONE ENCOUNTER
Patient seen by Cally at [facility]      Patient called stating he has a psa done on 01/06/25 and it is elevated 7.76.  His PCP requested for patient to call Urology and have MRI of prostate done.  Please review and call patient to see how to proceed.    Patient can be reached at [phone]